# Patient Record
Sex: MALE | Race: WHITE | Employment: OTHER | URBAN - METROPOLITAN AREA
[De-identification: names, ages, dates, MRNs, and addresses within clinical notes are randomized per-mention and may not be internally consistent; named-entity substitution may affect disease eponyms.]

---

## 2017-01-11 ENCOUNTER — HOSPITAL ENCOUNTER (OUTPATIENT)
Dept: OTHER | Age: 82
Discharge: HOME | End: 2017-01-11
Attending: INTERNAL MEDICINE | Admitting: INTERNAL MEDICINE

## 2017-01-11 DIAGNOSIS — J20.8 ACUTE BRONCHITIS DUE TO OTHER SPECIFIED ORGANISMS: ICD-10-CM

## 2018-12-21 ENCOUNTER — APPOINTMENT (OUTPATIENT)
Dept: MRI IMAGING | Age: 83
DRG: 179 | End: 2018-12-21
Payer: MEDICARE

## 2018-12-21 ENCOUNTER — APPOINTMENT (OUTPATIENT)
Dept: GENERAL RADIOLOGY | Age: 83
DRG: 179 | End: 2018-12-21
Payer: MEDICARE

## 2018-12-21 ENCOUNTER — HOSPITAL ENCOUNTER (INPATIENT)
Age: 83
LOS: 1 days | Discharge: HOME HEALTH | DRG: 179 | End: 2018-12-23
Attending: EMERGENCY MEDICINE | Admitting: INTERNAL MEDICINE
Payer: MEDICARE

## 2018-12-21 ENCOUNTER — APPOINTMENT (OUTPATIENT)
Dept: CT IMAGING | Age: 83
DRG: 179 | End: 2018-12-21
Payer: MEDICARE

## 2018-12-21 DIAGNOSIS — R07.9 CHEST PAIN, UNSPECIFIED TYPE: Primary | ICD-10-CM

## 2018-12-21 DIAGNOSIS — I48.20 CHRONIC ATRIAL FIBRILLATION (HCC): ICD-10-CM

## 2018-12-21 DIAGNOSIS — R55 SYNCOPE AND COLLAPSE: ICD-10-CM

## 2018-12-21 LAB
A/G RATIO: 1.4 (ref 1.1–2.2)
ALBUMIN SERPL-MCNC: 4 G/DL (ref 3.4–5)
ALP BLD-CCNC: 89 U/L (ref 40–129)
ALT SERPL-CCNC: 11 U/L (ref 10–40)
ANION GAP SERPL CALCULATED.3IONS-SCNC: 10 MMOL/L (ref 3–16)
AST SERPL-CCNC: 18 U/L (ref 15–37)
BASOPHILS ABSOLUTE: 0.1 K/UL (ref 0–0.2)
BASOPHILS RELATIVE PERCENT: 0.7 %
BILIRUB SERPL-MCNC: 0.4 MG/DL (ref 0–1)
BUN BLDV-MCNC: 21 MG/DL (ref 7–20)
CALCIUM SERPL-MCNC: 8.7 MG/DL (ref 8.3–10.6)
CHLORIDE BLD-SCNC: 108 MMOL/L (ref 99–110)
CO2: 19 MMOL/L (ref 21–32)
CREAT SERPL-MCNC: 1.1 MG/DL (ref 0.8–1.3)
EKG ATRIAL RATE: 64 BPM
EKG DIAGNOSIS: NORMAL
EKG Q-T INTERVAL: 366 MS
EKG QRS DURATION: 84 MS
EKG QTC CALCULATION (BAZETT): 400 MS
EKG R AXIS: 9 DEGREES
EKG T AXIS: 9 DEGREES
EKG VENTRICULAR RATE: 72 BPM
EOSINOPHILS ABSOLUTE: 0.1 K/UL (ref 0–0.6)
EOSINOPHILS RELATIVE PERCENT: 1.6 %
GFR AFRICAN AMERICAN: >60
GFR NON-AFRICAN AMERICAN: >60
GLOBULIN: 2.9 G/DL
GLUCOSE BLD-MCNC: 135 MG/DL (ref 70–99)
GLUCOSE BLD-MCNC: 94 MG/DL (ref 70–99)
HCT VFR BLD CALC: 34.3 % (ref 40.5–52.5)
HEMOGLOBIN: 11.2 G/DL (ref 13.5–17.5)
INR BLD: 1.89 (ref 0.86–1.14)
LIPASE: 49 U/L (ref 13–60)
LYMPHOCYTES ABSOLUTE: 2 K/UL (ref 1–5.1)
LYMPHOCYTES RELATIVE PERCENT: 24.2 %
MAGNESIUM: 1.4 MG/DL (ref 1.8–2.4)
MCH RBC QN AUTO: 31.4 PG (ref 26–34)
MCHC RBC AUTO-ENTMCNC: 32.8 G/DL (ref 31–36)
MCV RBC AUTO: 95.7 FL (ref 80–100)
MONOCYTES ABSOLUTE: 0.4 K/UL (ref 0–1.3)
MONOCYTES RELATIVE PERCENT: 5.3 %
NEUTROPHILS ABSOLUTE: 5.7 K/UL (ref 1.7–7.7)
NEUTROPHILS RELATIVE PERCENT: 68.2 %
PDW BLD-RTO: 15.7 % (ref 12.4–15.4)
PERFORMED ON: ABNORMAL
PLATELET # BLD: 167 K/UL (ref 135–450)
PMV BLD AUTO: 10.9 FL (ref 5–10.5)
POTASSIUM SERPL-SCNC: 5.1 MMOL/L (ref 3.5–5.1)
POTASSIUM SERPL-SCNC: 5.2 MMOL/L (ref 3.5–5.1)
PRO-BNP: 3011 PG/ML (ref 0–449)
PROTHROMBIN TIME: 21.6 SEC (ref 9.8–13)
RBC # BLD: 3.58 M/UL (ref 4.2–5.9)
SODIUM BLD-SCNC: 137 MMOL/L (ref 136–145)
TOTAL PROTEIN: 6.9 G/DL (ref 6.4–8.2)
TROPONIN: <0.01 NG/ML
WBC # BLD: 8.3 K/UL (ref 4–11)

## 2018-12-21 PROCEDURE — 80053 COMPREHEN METABOLIC PANEL: CPT

## 2018-12-21 PROCEDURE — 83735 ASSAY OF MAGNESIUM: CPT

## 2018-12-21 PROCEDURE — 83036 HEMOGLOBIN GLYCOSYLATED A1C: CPT

## 2018-12-21 PROCEDURE — 84132 ASSAY OF SERUM POTASSIUM: CPT

## 2018-12-21 PROCEDURE — G0378 HOSPITAL OBSERVATION PER HR: HCPCS

## 2018-12-21 PROCEDURE — 72125 CT NECK SPINE W/O DYE: CPT

## 2018-12-21 PROCEDURE — 2580000003 HC RX 258: Performed by: PHYSICIAN ASSISTANT

## 2018-12-21 PROCEDURE — 36415 COLL VENOUS BLD VENIPUNCTURE: CPT

## 2018-12-21 PROCEDURE — 71046 X-RAY EXAM CHEST 2 VIEWS: CPT

## 2018-12-21 PROCEDURE — 84443 ASSAY THYROID STIM HORMONE: CPT

## 2018-12-21 PROCEDURE — 6360000004 HC RX CONTRAST MEDICATION: Performed by: EMERGENCY MEDICINE

## 2018-12-21 PROCEDURE — 85610 PROTHROMBIN TIME: CPT

## 2018-12-21 PROCEDURE — 84484 ASSAY OF TROPONIN QUANT: CPT

## 2018-12-21 PROCEDURE — 70450 CT HEAD/BRAIN W/O DYE: CPT

## 2018-12-21 PROCEDURE — 83690 ASSAY OF LIPASE: CPT

## 2018-12-21 PROCEDURE — 6360000002 HC RX W HCPCS: Performed by: HOSPITALIST

## 2018-12-21 PROCEDURE — 96366 THER/PROPH/DIAG IV INF ADDON: CPT

## 2018-12-21 PROCEDURE — 96365 THER/PROPH/DIAG IV INF INIT: CPT

## 2018-12-21 PROCEDURE — 99285 EMERGENCY DEPT VISIT HI MDM: CPT

## 2018-12-21 PROCEDURE — 85025 COMPLETE CBC W/AUTO DIFF WBC: CPT

## 2018-12-21 PROCEDURE — 93005 ELECTROCARDIOGRAM TRACING: CPT | Performed by: EMERGENCY MEDICINE

## 2018-12-21 PROCEDURE — 74174 CTA ABD&PLVS W/CONTRAST: CPT

## 2018-12-21 PROCEDURE — 6370000000 HC RX 637 (ALT 250 FOR IP): Performed by: HOSPITALIST

## 2018-12-21 PROCEDURE — 83880 ASSAY OF NATRIURETIC PEPTIDE: CPT

## 2018-12-21 PROCEDURE — 2580000003 HC RX 258: Performed by: HOSPITALIST

## 2018-12-21 PROCEDURE — 93010 ELECTROCARDIOGRAM REPORT: CPT | Performed by: INTERNAL MEDICINE

## 2018-12-21 PROCEDURE — 71275 CT ANGIOGRAPHY CHEST: CPT

## 2018-12-21 RX ORDER — ATORVASTATIN CALCIUM 40 MG/1
40 TABLET, FILM COATED ORAL DAILY
Status: DISCONTINUED | OUTPATIENT
Start: 2018-12-21 | End: 2018-12-23 | Stop reason: HOSPADM

## 2018-12-21 RX ORDER — NICOTINE POLACRILEX 4 MG
15 LOZENGE BUCCAL PRN
Status: DISCONTINUED | OUTPATIENT
Start: 2018-12-21 | End: 2018-12-23 | Stop reason: HOSPADM

## 2018-12-21 RX ORDER — ATENOLOL 50 MG/1
50 TABLET ORAL DAILY
Status: DISCONTINUED | OUTPATIENT
Start: 2018-12-22 | End: 2018-12-22

## 2018-12-21 RX ORDER — MAGNESIUM OXIDE 400 MG/1
400 TABLET ORAL DAILY
COMMUNITY

## 2018-12-21 RX ORDER — ONDANSETRON 2 MG/ML
4 INJECTION INTRAMUSCULAR; INTRAVENOUS EVERY 6 HOURS PRN
Status: DISCONTINUED | OUTPATIENT
Start: 2018-12-21 | End: 2018-12-23 | Stop reason: HOSPADM

## 2018-12-21 RX ORDER — WARFARIN SODIUM 3 MG/1
3 TABLET ORAL DAILY
COMMUNITY

## 2018-12-21 RX ORDER — PANTOPRAZOLE SODIUM 40 MG/1
40 TABLET, DELAYED RELEASE ORAL
Status: DISCONTINUED | OUTPATIENT
Start: 2018-12-22 | End: 2018-12-23 | Stop reason: HOSPADM

## 2018-12-21 RX ORDER — DIPHENOXYLATE HYDROCHLORIDE AND ATROPINE SULFATE 2.5; .025 MG/1; MG/1
1 TABLET ORAL 3 TIMES DAILY PRN
Status: ON HOLD | COMMUNITY
End: 2020-06-17 | Stop reason: HOSPADM

## 2018-12-21 RX ORDER — GEMFIBROZIL 600 MG/1
600 TABLET, FILM COATED ORAL 2 TIMES DAILY
Status: DISCONTINUED | OUTPATIENT
Start: 2018-12-21 | End: 2018-12-23 | Stop reason: HOSPADM

## 2018-12-21 RX ORDER — SODIUM CHLORIDE 0.9 % (FLUSH) 0.9 %
10 SYRINGE (ML) INJECTION EVERY 12 HOURS SCHEDULED
Status: DISCONTINUED | OUTPATIENT
Start: 2018-12-21 | End: 2018-12-23 | Stop reason: HOSPADM

## 2018-12-21 RX ORDER — DEXTROSE MONOHYDRATE 50 MG/ML
100 INJECTION, SOLUTION INTRAVENOUS PRN
Status: DISCONTINUED | OUTPATIENT
Start: 2018-12-21 | End: 2018-12-23 | Stop reason: HOSPADM

## 2018-12-21 RX ORDER — RAMIPRIL 5 MG/1
10 CAPSULE ORAL DAILY
Status: DISCONTINUED | OUTPATIENT
Start: 2018-12-22 | End: 2018-12-23 | Stop reason: HOSPADM

## 2018-12-21 RX ORDER — DILTIAZEM HYDROCHLORIDE 5 MG/ML
10 INJECTION INTRAVENOUS ONCE
Status: DISCONTINUED | OUTPATIENT
Start: 2018-12-21 | End: 2018-12-22

## 2018-12-21 RX ORDER — SODIUM CHLORIDE 0.9 % (FLUSH) 0.9 %
10 SYRINGE (ML) INJECTION PRN
Status: DISCONTINUED | OUTPATIENT
Start: 2018-12-21 | End: 2018-12-23 | Stop reason: HOSPADM

## 2018-12-21 RX ORDER — TAMSULOSIN HYDROCHLORIDE 0.4 MG/1
0.4 CAPSULE ORAL DAILY
Status: DISCONTINUED | OUTPATIENT
Start: 2018-12-21 | End: 2018-12-23 | Stop reason: HOSPADM

## 2018-12-21 RX ORDER — HYDRALAZINE HYDROCHLORIDE 20 MG/ML
10 INJECTION INTRAMUSCULAR; INTRAVENOUS EVERY 6 HOURS PRN
Status: DISCONTINUED | OUTPATIENT
Start: 2018-12-21 | End: 2018-12-23 | Stop reason: HOSPADM

## 2018-12-21 RX ORDER — DEXTROSE MONOHYDRATE 25 G/50ML
12.5 INJECTION, SOLUTION INTRAVENOUS PRN
Status: DISCONTINUED | OUTPATIENT
Start: 2018-12-21 | End: 2018-12-23 | Stop reason: HOSPADM

## 2018-12-21 RX ORDER — MAGNESIUM SULFATE IN WATER 40 MG/ML
2 INJECTION, SOLUTION INTRAVENOUS ONCE
Status: COMPLETED | OUTPATIENT
Start: 2018-12-21 | End: 2018-12-21

## 2018-12-21 RX ORDER — 0.9 % SODIUM CHLORIDE 0.9 %
500 INTRAVENOUS SOLUTION INTRAVENOUS ONCE
Status: COMPLETED | OUTPATIENT
Start: 2018-12-21 | End: 2018-12-22

## 2018-12-21 RX ADMIN — TAMSULOSIN HYDROCHLORIDE 0.4 MG: 0.4 CAPSULE ORAL at 22:14

## 2018-12-21 RX ADMIN — SODIUM CHLORIDE 500 ML: 9 INJECTION, SOLUTION INTRAVENOUS at 17:36

## 2018-12-21 RX ADMIN — Medication 10 ML: at 22:18

## 2018-12-21 RX ADMIN — GEMFIBROZIL 600 MG: 600 TABLET ORAL at 22:15

## 2018-12-21 RX ADMIN — MAGNESIUM SULFATE HEPTAHYDRATE 2 G: 40 INJECTION, SOLUTION INTRAVENOUS at 21:38

## 2018-12-21 RX ADMIN — IOPAMIDOL 100 ML: 755 INJECTION, SOLUTION INTRAVENOUS at 16:27

## 2018-12-21 RX ADMIN — DESMOPRESSIN ACETATE 40 MG: 0.2 TABLET ORAL at 22:14

## 2018-12-21 RX ADMIN — WARFARIN SODIUM 3 MG: 2 TABLET ORAL at 22:14

## 2018-12-21 ASSESSMENT — ENCOUNTER SYMPTOMS
VOMITING: 0
DIARRHEA: 0
SHORTNESS OF BREATH: 0
ABDOMINAL PAIN: 0
NAUSEA: 0
RHINORRHEA: 0
COUGH: 0

## 2018-12-21 ASSESSMENT — PAIN SCALES - GENERAL
PAINLEVEL_OUTOF10: 0
PAINLEVEL_OUTOF10: 0

## 2018-12-22 ENCOUNTER — APPOINTMENT (OUTPATIENT)
Dept: MRI IMAGING | Age: 83
DRG: 179 | End: 2018-12-22
Payer: MEDICARE

## 2018-12-22 PROBLEM — K21.9 GERD (GASTROESOPHAGEAL REFLUX DISEASE): Status: ACTIVE | Noted: 2018-12-22

## 2018-12-22 PROBLEM — Z79.01 ANTICOAGULATED: Status: ACTIVE | Noted: 2018-12-22

## 2018-12-22 PROBLEM — E11.9 DM2 (DIABETES MELLITUS, TYPE 2) (HCC): Status: ACTIVE | Noted: 2018-12-22

## 2018-12-22 PROBLEM — N18.2 CKD (CHRONIC KIDNEY DISEASE) STAGE 2, GFR 60-89 ML/MIN: Status: ACTIVE | Noted: 2018-12-22

## 2018-12-22 PROBLEM — K57.90 DIVERTICULOSIS: Status: ACTIVE | Noted: 2018-12-22

## 2018-12-22 PROBLEM — J69.0 ASPIRATION PNEUMONITIS (HCC): Status: ACTIVE | Noted: 2018-12-22

## 2018-12-22 PROBLEM — R07.9 CHEST PAIN: Status: ACTIVE | Noted: 2018-12-22

## 2018-12-22 PROBLEM — E66.3 OVERWEIGHT (BMI 25.0-29.9): Status: ACTIVE | Noted: 2018-12-22

## 2018-12-22 PROBLEM — E83.42 HYPOMAGNESEMIA: Status: ACTIVE | Noted: 2018-12-22

## 2018-12-22 PROBLEM — R13.10 DYSPHAGIA: Status: ACTIVE | Noted: 2018-12-22

## 2018-12-22 LAB
ANION GAP SERPL CALCULATED.3IONS-SCNC: 9 MMOL/L (ref 3–16)
BUN BLDV-MCNC: 18 MG/DL (ref 7–20)
CALCIUM SERPL-MCNC: 8.5 MG/DL (ref 8.3–10.6)
CHLORIDE BLD-SCNC: 111 MMOL/L (ref 99–110)
CO2: 21 MMOL/L (ref 21–32)
CREAT SERPL-MCNC: 1.2 MG/DL (ref 0.8–1.3)
ESTIMATED AVERAGE GLUCOSE: 114 MG/DL
GFR AFRICAN AMERICAN: >60
GFR NON-AFRICAN AMERICAN: 58
GLUCOSE BLD-MCNC: 102 MG/DL (ref 70–99)
GLUCOSE BLD-MCNC: 102 MG/DL (ref 70–99)
GLUCOSE BLD-MCNC: 107 MG/DL (ref 70–99)
GLUCOSE BLD-MCNC: 108 MG/DL (ref 70–99)
GLUCOSE BLD-MCNC: 111 MG/DL (ref 70–99)
HBA1C MFR BLD: 5.6 %
INR BLD: 1.94 (ref 0.86–1.14)
LEFT VENTRICULAR EJECTION FRACTION HIGH VALUE: 55 %
LEFT VENTRICULAR EJECTION FRACTION MODE: NORMAL
LV EF: 55 %
LV EF: 69 %
LVEF MODALITY: NORMAL
LVEF MODALITY: NORMAL
PERFORMED ON: ABNORMAL
POTASSIUM REFLEX MAGNESIUM: 4.7 MMOL/L (ref 3.5–5.1)
PROTHROMBIN TIME: 22.1 SEC (ref 9.8–13)
SODIUM BLD-SCNC: 141 MMOL/L (ref 136–145)
TSH REFLEX: 1.88 UIU/ML (ref 0.27–4.2)

## 2018-12-22 PROCEDURE — 93017 CV STRESS TEST TRACING ONLY: CPT | Performed by: INTERNAL MEDICINE

## 2018-12-22 PROCEDURE — 2500000003 HC RX 250 WO HCPCS: Performed by: INTERNAL MEDICINE

## 2018-12-22 PROCEDURE — G0378 HOSPITAL OBSERVATION PER HR: HCPCS

## 2018-12-22 PROCEDURE — 99222 1ST HOSP IP/OBS MODERATE 55: CPT | Performed by: INTERNAL MEDICINE

## 2018-12-22 PROCEDURE — 70551 MRI BRAIN STEM W/O DYE: CPT

## 2018-12-22 PROCEDURE — 82607 VITAMIN B-12: CPT

## 2018-12-22 PROCEDURE — 93306 TTE W/DOPPLER COMPLETE: CPT

## 2018-12-22 PROCEDURE — 80048 BASIC METABOLIC PNL TOTAL CA: CPT

## 2018-12-22 PROCEDURE — 6360000002 HC RX W HCPCS: Performed by: INTERNAL MEDICINE

## 2018-12-22 PROCEDURE — 92526 ORAL FUNCTION THERAPY: CPT

## 2018-12-22 PROCEDURE — A9502 TC99M TETROFOSMIN: HCPCS | Performed by: HOSPITALIST

## 2018-12-22 PROCEDURE — 6370000000 HC RX 637 (ALT 250 FOR IP): Performed by: INTERNAL MEDICINE

## 2018-12-22 PROCEDURE — 3430000000 HC RX DIAGNOSTIC RADIOPHARMACEUTICAL: Performed by: HOSPITALIST

## 2018-12-22 PROCEDURE — 92610 EVALUATE SWALLOWING FUNCTION: CPT

## 2018-12-22 PROCEDURE — 80061 LIPID PANEL: CPT

## 2018-12-22 PROCEDURE — 2580000003 HC RX 258: Performed by: HOSPITALIST

## 2018-12-22 PROCEDURE — 6370000000 HC RX 637 (ALT 250 FOR IP): Performed by: HOSPITALIST

## 2018-12-22 PROCEDURE — 85610 PROTHROMBIN TIME: CPT

## 2018-12-22 PROCEDURE — G8997 SWALLOW GOAL STATUS: HCPCS

## 2018-12-22 PROCEDURE — 1200000000 HC SEMI PRIVATE

## 2018-12-22 PROCEDURE — 2580000003 HC RX 258

## 2018-12-22 PROCEDURE — 78452 HT MUSCLE IMAGE SPECT MULT: CPT | Performed by: INTERNAL MEDICINE

## 2018-12-22 PROCEDURE — 36415 COLL VENOUS BLD VENIPUNCTURE: CPT

## 2018-12-22 PROCEDURE — G8996 SWALLOW CURRENT STATUS: HCPCS

## 2018-12-22 RX ORDER — LORAZEPAM 2 MG/ML
1 INJECTION INTRAMUSCULAR ONCE
Status: COMPLETED | OUTPATIENT
Start: 2018-12-22 | End: 2018-12-22

## 2018-12-22 RX ORDER — ACETAMINOPHEN 325 MG/1
650 TABLET ORAL EVERY 4 HOURS PRN
Status: DISCONTINUED | OUTPATIENT
Start: 2018-12-22 | End: 2018-12-23 | Stop reason: HOSPADM

## 2018-12-22 RX ORDER — SODIUM CHLORIDE 9 MG/ML
INJECTION, SOLUTION INTRAVENOUS
Status: COMPLETED
Start: 2018-12-22 | End: 2018-12-22

## 2018-12-22 RX ORDER — ATENOLOL 50 MG/1
50 TABLET ORAL DAILY
Status: DISCONTINUED | OUTPATIENT
Start: 2018-12-22 | End: 2018-12-23 | Stop reason: HOSPADM

## 2018-12-22 RX ORDER — AMINOPHYLLINE DIHYDRATE 25 MG/ML
100 INJECTION, SOLUTION INTRAVENOUS ONCE
Status: DISCONTINUED | OUTPATIENT
Start: 2018-12-22 | End: 2018-12-22

## 2018-12-22 RX ADMIN — Medication 10 ML: at 08:50

## 2018-12-22 RX ADMIN — Medication 10 ML: at 12:28

## 2018-12-22 RX ADMIN — LORAZEPAM 1 MG: 2 INJECTION INTRAMUSCULAR; INTRAVENOUS at 12:28

## 2018-12-22 RX ADMIN — RAMIPRIL 10 MG: 5 CAPSULE ORAL at 12:04

## 2018-12-22 RX ADMIN — GEMFIBROZIL 600 MG: 600 TABLET ORAL at 12:04

## 2018-12-22 RX ADMIN — TAZOBACTAM SODIUM AND PIPERACILLIN SODIUM 3.38 G: 375; 3 INJECTION, SOLUTION INTRAVENOUS at 14:03

## 2018-12-22 RX ADMIN — REGADENOSON 0.4 MG: 0.08 INJECTION, SOLUTION INTRAVENOUS at 10:11

## 2018-12-22 RX ADMIN — DESMOPRESSIN ACETATE 40 MG: 0.2 TABLET ORAL at 20:50

## 2018-12-22 RX ADMIN — WARFARIN SODIUM 3 MG: 2 TABLET ORAL at 17:52

## 2018-12-22 RX ADMIN — SODIUM CHLORIDE 50 ML: 9 INJECTION, SOLUTION INTRAVENOUS at 14:04

## 2018-12-22 RX ADMIN — TAMSULOSIN HYDROCHLORIDE 0.4 MG: 0.4 CAPSULE ORAL at 20:50

## 2018-12-22 RX ADMIN — ATENOLOL 50 MG: 50 TABLET ORAL at 12:04

## 2018-12-22 RX ADMIN — TETROFOSMIN 10 MILLICURIE: 0.23 INJECTION, POWDER, LYOPHILIZED, FOR SOLUTION INTRAVENOUS at 09:00

## 2018-12-22 RX ADMIN — TETROFOSMIN 30 MILLICURIE: 0.23 INJECTION, POWDER, LYOPHILIZED, FOR SOLUTION INTRAVENOUS at 10:18

## 2018-12-22 RX ADMIN — Medication 10 ML: at 20:51

## 2018-12-22 RX ADMIN — TAZOBACTAM SODIUM AND PIPERACILLIN SODIUM 3.38 G: 375; 3 INJECTION, SOLUTION INTRAVENOUS at 22:08

## 2018-12-22 RX ADMIN — GEMFIBROZIL 600 MG: 600 TABLET ORAL at 20:51

## 2018-12-22 RX ADMIN — PANTOPRAZOLE SODIUM 40 MG: 40 TABLET, DELAYED RELEASE ORAL at 08:50

## 2018-12-22 ASSESSMENT — PAIN SCALES - GENERAL
PAINLEVEL_OUTOF10: 0

## 2018-12-23 VITALS
TEMPERATURE: 98.2 F | RESPIRATION RATE: 18 BRPM | SYSTOLIC BLOOD PRESSURE: 147 MMHG | BODY MASS INDEX: 26.84 KG/M2 | DIASTOLIC BLOOD PRESSURE: 78 MMHG | HEIGHT: 66 IN | HEART RATE: 84 BPM | OXYGEN SATURATION: 99 % | WEIGHT: 167 LBS

## 2018-12-23 LAB
ANION GAP SERPL CALCULATED.3IONS-SCNC: 10 MMOL/L (ref 3–16)
BASOPHILS ABSOLUTE: 0 K/UL (ref 0–0.2)
BASOPHILS RELATIVE PERCENT: 0.7 %
BUN BLDV-MCNC: 15 MG/DL (ref 7–20)
CALCIUM SERPL-MCNC: 8.5 MG/DL (ref 8.3–10.6)
CHLORIDE BLD-SCNC: 111 MMOL/L (ref 99–110)
CHOLESTEROL, TOTAL: 93 MG/DL (ref 0–199)
CO2: 19 MMOL/L (ref 21–32)
CREAT SERPL-MCNC: 1.1 MG/DL (ref 0.8–1.3)
EOSINOPHILS ABSOLUTE: 0.2 K/UL (ref 0–0.6)
EOSINOPHILS RELATIVE PERCENT: 2.1 %
GFR AFRICAN AMERICAN: >60
GFR NON-AFRICAN AMERICAN: >60
GLUCOSE BLD-MCNC: 103 MG/DL (ref 70–99)
GLUCOSE BLD-MCNC: 105 MG/DL (ref 70–99)
GLUCOSE BLD-MCNC: 161 MG/DL (ref 70–99)
HCT VFR BLD CALC: 33.2 % (ref 40.5–52.5)
HDLC SERPL-MCNC: 34 MG/DL (ref 40–60)
HEMOGLOBIN: 11 G/DL (ref 13.5–17.5)
INR BLD: 2.88 (ref 0.86–1.14)
LDL CHOLESTEROL CALCULATED: 51 MG/DL
LYMPHOCYTES ABSOLUTE: 2.1 K/UL (ref 1–5.1)
LYMPHOCYTES RELATIVE PERCENT: 28.2 %
MAGNESIUM: 1.7 MG/DL (ref 1.8–2.4)
MCH RBC QN AUTO: 31.4 PG (ref 26–34)
MCHC RBC AUTO-ENTMCNC: 33 G/DL (ref 31–36)
MCV RBC AUTO: 95.2 FL (ref 80–100)
MONOCYTES ABSOLUTE: 0.5 K/UL (ref 0–1.3)
MONOCYTES RELATIVE PERCENT: 7.4 %
NEUTROPHILS ABSOLUTE: 4.6 K/UL (ref 1.7–7.7)
NEUTROPHILS RELATIVE PERCENT: 61.6 %
PDW BLD-RTO: 15.3 % (ref 12.4–15.4)
PERFORMED ON: ABNORMAL
PERFORMED ON: ABNORMAL
PLATELET # BLD: 147 K/UL (ref 135–450)
PMV BLD AUTO: 10.7 FL (ref 5–10.5)
POTASSIUM SERPL-SCNC: 4.2 MMOL/L (ref 3.5–5.1)
PROTHROMBIN TIME: 32.8 SEC (ref 9.8–13)
RBC # BLD: 3.48 M/UL (ref 4.2–5.9)
SODIUM BLD-SCNC: 140 MMOL/L (ref 136–145)
TRIGL SERPL-MCNC: 42 MG/DL (ref 0–150)
VITAMIN B-12: 1288 PG/ML (ref 211–911)
VLDLC SERPL CALC-MCNC: 8 MG/DL
WBC # BLD: 7.4 K/UL (ref 4–11)

## 2018-12-23 PROCEDURE — G8987 SELF CARE CURRENT STATUS: HCPCS

## 2018-12-23 PROCEDURE — 6370000000 HC RX 637 (ALT 250 FOR IP): Performed by: HOSPITALIST

## 2018-12-23 PROCEDURE — 97530 THERAPEUTIC ACTIVITIES: CPT

## 2018-12-23 PROCEDURE — 97165 OT EVAL LOW COMPLEX 30 MIN: CPT

## 2018-12-23 PROCEDURE — 2580000003 HC RX 258: Performed by: HOSPITALIST

## 2018-12-23 PROCEDURE — 96366 THER/PROPH/DIAG IV INF ADDON: CPT

## 2018-12-23 PROCEDURE — 99231 SBSQ HOSP IP/OBS SF/LOW 25: CPT | Performed by: NURSE PRACTITIONER

## 2018-12-23 PROCEDURE — G8989 SELF CARE D/C STATUS: HCPCS

## 2018-12-23 PROCEDURE — G8979 MOBILITY GOAL STATUS: HCPCS

## 2018-12-23 PROCEDURE — 6370000000 HC RX 637 (ALT 250 FOR IP): Performed by: INTERNAL MEDICINE

## 2018-12-23 PROCEDURE — 85025 COMPLETE CBC W/AUTO DIFF WBC: CPT

## 2018-12-23 PROCEDURE — G8980 MOBILITY D/C STATUS: HCPCS

## 2018-12-23 PROCEDURE — G8988 SELF CARE GOAL STATUS: HCPCS

## 2018-12-23 PROCEDURE — 36415 COLL VENOUS BLD VENIPUNCTURE: CPT

## 2018-12-23 PROCEDURE — 2500000003 HC RX 250 WO HCPCS: Performed by: INTERNAL MEDICINE

## 2018-12-23 PROCEDURE — 85610 PROTHROMBIN TIME: CPT

## 2018-12-23 PROCEDURE — 83735 ASSAY OF MAGNESIUM: CPT

## 2018-12-23 PROCEDURE — G8978 MOBILITY CURRENT STATUS: HCPCS

## 2018-12-23 PROCEDURE — G0378 HOSPITAL OBSERVATION PER HR: HCPCS

## 2018-12-23 PROCEDURE — 97161 PT EVAL LOW COMPLEX 20 MIN: CPT

## 2018-12-23 PROCEDURE — 80048 BASIC METABOLIC PNL TOTAL CA: CPT

## 2018-12-23 RX ORDER — WARFARIN SODIUM 2 MG/1
2 TABLET ORAL DAILY
Status: DISCONTINUED | OUTPATIENT
Start: 2018-12-24 | End: 2018-12-23 | Stop reason: HOSPADM

## 2018-12-23 RX ORDER — WARFARIN SODIUM 2 MG/1
2 TABLET ORAL DAILY
Status: DISCONTINUED | OUTPATIENT
Start: 2018-12-23 | End: 2018-12-23

## 2018-12-23 RX ORDER — AMOXICILLIN AND CLAVULANATE POTASSIUM 875; 125 MG/1; MG/1
1 TABLET, FILM COATED ORAL 2 TIMES DAILY
Qty: 12 TABLET | Refills: 0 | Status: SHIPPED | OUTPATIENT
Start: 2018-12-23 | End: 2018-12-29

## 2018-12-23 RX ADMIN — TAZOBACTAM SODIUM AND PIPERACILLIN SODIUM 3.38 G: 375; 3 INJECTION, SOLUTION INTRAVENOUS at 06:11

## 2018-12-23 RX ADMIN — RAMIPRIL 10 MG: 5 CAPSULE ORAL at 09:55

## 2018-12-23 RX ADMIN — GEMFIBROZIL 600 MG: 600 TABLET ORAL at 09:56

## 2018-12-23 RX ADMIN — Medication 10 ML: at 09:56

## 2018-12-23 RX ADMIN — ATENOLOL 50 MG: 50 TABLET ORAL at 09:55

## 2018-12-23 RX ADMIN — PANTOPRAZOLE SODIUM 40 MG: 40 TABLET, DELAYED RELEASE ORAL at 06:53

## 2018-12-23 ASSESSMENT — PAIN SCALES - GENERAL: PAINLEVEL_OUTOF10: 0

## 2018-12-31 ENCOUNTER — HOSPITAL ENCOUNTER (OUTPATIENT)
Dept: GENERAL RADIOLOGY | Age: 83
Discharge: HOME | End: 2018-12-31
Payer: MEDICARE

## 2018-12-31 DIAGNOSIS — R13.11 ORAL PHASE DYSPHAGIA: ICD-10-CM

## 2018-12-31 DIAGNOSIS — R13.10 DYSPHAGIA, UNSPECIFIED TYPE: ICD-10-CM

## 2018-12-31 PROCEDURE — 74230 X-RAY XM SWLNG FUNCJ C+: CPT

## 2018-12-31 PROCEDURE — 92526 ORAL FUNCTION THERAPY: CPT

## 2018-12-31 PROCEDURE — G8996 SWALLOW CURRENT STATUS: HCPCS

## 2018-12-31 PROCEDURE — 74220 X-RAY XM ESOPHAGUS 1CNTRST: CPT

## 2018-12-31 PROCEDURE — G8997 SWALLOW GOAL STATUS: HCPCS

## 2018-12-31 PROCEDURE — 92611 MOTION FLUOROSCOPY/SWALLOW: CPT

## 2019-01-18 ENCOUNTER — TELEPHONE (OUTPATIENT)
Dept: INPATIENT UNIT | Age: 84
End: 2019-01-18

## 2020-06-03 ENCOUNTER — APPOINTMENT (OUTPATIENT)
Dept: CT IMAGING | Age: 85
End: 2020-06-03
Payer: MEDICARE

## 2020-06-03 ENCOUNTER — HOSPITAL ENCOUNTER (EMERGENCY)
Age: 85
Discharge: PSYCHIATRIC OR SUBSTANCE USE FACILITY | End: 2020-06-04
Attending: EMERGENCY MEDICINE
Payer: MEDICARE

## 2020-06-03 ENCOUNTER — APPOINTMENT (OUTPATIENT)
Dept: GENERAL RADIOLOGY | Age: 85
End: 2020-06-03
Payer: MEDICARE

## 2020-06-03 LAB
A/G RATIO: 1.4 (ref 1.1–2.2)
ACETAMINOPHEN LEVEL: <5 UG/ML (ref 10–30)
ALBUMIN SERPL-MCNC: 3.9 G/DL (ref 3.4–5)
ALP BLD-CCNC: 97 U/L (ref 40–129)
ALT SERPL-CCNC: 14 U/L (ref 10–40)
AMPHETAMINE SCREEN, URINE: NORMAL
ANION GAP SERPL CALCULATED.3IONS-SCNC: 11 MMOL/L (ref 3–16)
APTT: 40.8 SEC (ref 24.2–36.2)
AST SERPL-CCNC: 18 U/L (ref 15–37)
BARBITURATE SCREEN URINE: NORMAL
BASOPHILS ABSOLUTE: 0.1 K/UL (ref 0–0.2)
BASOPHILS RELATIVE PERCENT: 0.7 %
BENZODIAZEPINE SCREEN, URINE: NORMAL
BILIRUB SERPL-MCNC: 0.5 MG/DL (ref 0–1)
BILIRUBIN URINE: NEGATIVE
BLOOD, URINE: NEGATIVE
BUN BLDV-MCNC: 19 MG/DL (ref 7–20)
CALCIUM SERPL-MCNC: 8.7 MG/DL (ref 8.3–10.6)
CANNABINOID SCREEN URINE: NORMAL
CHLORIDE BLD-SCNC: 106 MMOL/L (ref 99–110)
CLARITY: CLEAR
CO2: 23 MMOL/L (ref 21–32)
COCAINE METABOLITE SCREEN URINE: NORMAL
COLOR: YELLOW
CREAT SERPL-MCNC: 1.2 MG/DL (ref 0.8–1.3)
EKG ATRIAL RATE: 105 BPM
EKG DIAGNOSIS: NORMAL
EKG Q-T INTERVAL: 374 MS
EKG QRS DURATION: 86 MS
EKG QTC CALCULATION (BAZETT): 431 MS
EKG R AXIS: 16 DEGREES
EKG T AXIS: 35 DEGREES
EKG VENTRICULAR RATE: 80 BPM
EOSINOPHILS ABSOLUTE: 0.1 K/UL (ref 0–0.6)
EOSINOPHILS RELATIVE PERCENT: 0.7 %
EPITHELIAL CELLS, UA: NORMAL /HPF (ref 0–5)
ETHANOL: NORMAL MG/DL (ref 0–0.08)
GFR AFRICAN AMERICAN: >60
GFR NON-AFRICAN AMERICAN: 58
GLOBULIN: 2.8 G/DL
GLUCOSE BLD-MCNC: 160 MG/DL (ref 70–99)
GLUCOSE URINE: NEGATIVE MG/DL
HCT VFR BLD CALC: 35.5 % (ref 40.5–52.5)
HEMOGLOBIN: 11.8 G/DL (ref 13.5–17.5)
INR BLD: 2.73 (ref 0.86–1.14)
KETONES, URINE: NEGATIVE MG/DL
LEUKOCYTE ESTERASE, URINE: NEGATIVE
LYMPHOCYTES ABSOLUTE: 2.3 K/UL (ref 1–5.1)
LYMPHOCYTES RELATIVE PERCENT: 25.2 %
Lab: NORMAL
MCH RBC QN AUTO: 30.7 PG (ref 26–34)
MCHC RBC AUTO-ENTMCNC: 33.4 G/DL (ref 31–36)
MCV RBC AUTO: 92.1 FL (ref 80–100)
METHADONE SCREEN, URINE: NORMAL
MICROSCOPIC EXAMINATION: YES
MONOCYTES ABSOLUTE: 0.6 K/UL (ref 0–1.3)
MONOCYTES RELATIVE PERCENT: 6.3 %
NEUTROPHILS ABSOLUTE: 6.1 K/UL (ref 1.7–7.7)
NEUTROPHILS RELATIVE PERCENT: 67.1 %
NITRITE, URINE: NEGATIVE
OPIATE SCREEN URINE: NORMAL
OXYCODONE URINE: NORMAL
PDW BLD-RTO: 15.3 % (ref 12.4–15.4)
PH UA: 6
PH UA: 6 (ref 5–8)
PHENCYCLIDINE SCREEN URINE: NORMAL
PLATELET # BLD: 178 K/UL (ref 135–450)
PMV BLD AUTO: 10.3 FL (ref 5–10.5)
POTASSIUM SERPL-SCNC: 3.9 MMOL/L (ref 3.5–5.1)
PRO-BNP: 2335 PG/ML (ref 0–449)
PROPOXYPHENE SCREEN: NORMAL
PROTEIN UA: 100 MG/DL
PROTHROMBIN TIME: 32 SEC (ref 10–13.2)
RBC # BLD: 3.85 M/UL (ref 4.2–5.9)
RBC UA: NORMAL /HPF (ref 0–4)
SALICYLATE, SERUM: 2.9 MG/DL (ref 15–30)
SODIUM BLD-SCNC: 140 MMOL/L (ref 136–145)
SPECIFIC GRAVITY UA: 1.02 (ref 1–1.03)
TOTAL PROTEIN: 6.7 G/DL (ref 6.4–8.2)
TROPONIN: <0.01 NG/ML
URINE REFLEX TO CULTURE: ABNORMAL
URINE TYPE: ABNORMAL
UROBILINOGEN, URINE: 0.2 E.U./DL
WBC # BLD: 9 K/UL (ref 4–11)
WBC UA: NORMAL /HPF (ref 0–5)

## 2020-06-03 PROCEDURE — 85025 COMPLETE CBC W/AUTO DIFF WBC: CPT

## 2020-06-03 PROCEDURE — 85730 THROMBOPLASTIN TIME PARTIAL: CPT

## 2020-06-03 PROCEDURE — G0480 DRUG TEST DEF 1-7 CLASSES: HCPCS

## 2020-06-03 PROCEDURE — 81001 URINALYSIS AUTO W/SCOPE: CPT

## 2020-06-03 PROCEDURE — 70450 CT HEAD/BRAIN W/O DYE: CPT

## 2020-06-03 PROCEDURE — 83880 ASSAY OF NATRIURETIC PEPTIDE: CPT

## 2020-06-03 PROCEDURE — 96365 THER/PROPH/DIAG IV INF INIT: CPT

## 2020-06-03 PROCEDURE — 6360000002 HC RX W HCPCS

## 2020-06-03 PROCEDURE — 96366 THER/PROPH/DIAG IV INF ADDON: CPT

## 2020-06-03 PROCEDURE — 71045 X-RAY EXAM CHEST 1 VIEW: CPT

## 2020-06-03 PROCEDURE — 6360000002 HC RX W HCPCS: Performed by: PHYSICIAN ASSISTANT

## 2020-06-03 PROCEDURE — 84484 ASSAY OF TROPONIN QUANT: CPT

## 2020-06-03 PROCEDURE — 80053 COMPREHEN METABOLIC PANEL: CPT

## 2020-06-03 PROCEDURE — 93005 ELECTROCARDIOGRAM TRACING: CPT | Performed by: PHYSICIAN ASSISTANT

## 2020-06-03 PROCEDURE — 85610 PROTHROMBIN TIME: CPT

## 2020-06-03 PROCEDURE — 96374 THER/PROPH/DIAG INJ IV PUSH: CPT

## 2020-06-03 PROCEDURE — 99285 EMERGENCY DEPT VISIT HI MDM: CPT

## 2020-06-03 PROCEDURE — 93010 ELECTROCARDIOGRAM REPORT: CPT | Performed by: INTERNAL MEDICINE

## 2020-06-03 PROCEDURE — 6370000000 HC RX 637 (ALT 250 FOR IP): Performed by: EMERGENCY MEDICINE

## 2020-06-03 PROCEDURE — 80307 DRUG TEST PRSMV CHEM ANLYZR: CPT

## 2020-06-03 PROCEDURE — 71250 CT THORAX DX C-: CPT

## 2020-06-03 PROCEDURE — 96376 TX/PRO/DX INJ SAME DRUG ADON: CPT

## 2020-06-03 RX ORDER — LORAZEPAM 2 MG/ML
INJECTION INTRAMUSCULAR
Status: COMPLETED
Start: 2020-06-03 | End: 2020-06-03

## 2020-06-03 RX ORDER — GEMFIBROZIL 600 MG/1
600 TABLET, FILM COATED ORAL ONCE
Status: COMPLETED | OUTPATIENT
Start: 2020-06-03 | End: 2020-06-03

## 2020-06-03 RX ORDER — ATENOLOL 50 MG/1
50 TABLET ORAL DAILY
Status: DISCONTINUED | OUTPATIENT
Start: 2020-06-03 | End: 2020-06-04 | Stop reason: HOSPADM

## 2020-06-03 RX ORDER — OMEPRAZOLE 20 MG/1
40 CAPSULE, DELAYED RELEASE ORAL DAILY
COMMUNITY
End: 2020-06-09 | Stop reason: ALTCHOICE

## 2020-06-03 RX ORDER — DONEPEZIL HYDROCHLORIDE 5 MG/1
5 TABLET, FILM COATED ORAL ONCE
Status: COMPLETED | OUTPATIENT
Start: 2020-06-03 | End: 2020-06-03

## 2020-06-03 RX ORDER — DONEPEZIL HYDROCHLORIDE 5 MG/1
10 TABLET, FILM COATED ORAL NIGHTLY
COMMUNITY

## 2020-06-03 RX ORDER — ATORVASTATIN CALCIUM 80 MG/1
40 TABLET, FILM COATED ORAL NIGHTLY
Status: DISCONTINUED | OUTPATIENT
Start: 2020-06-03 | End: 2020-06-04 | Stop reason: HOSPADM

## 2020-06-03 RX ORDER — LORAZEPAM 2 MG/ML
0.5 INJECTION INTRAMUSCULAR ONCE
Status: COMPLETED | OUTPATIENT
Start: 2020-06-03 | End: 2020-06-03

## 2020-06-03 RX ORDER — ATORVASTATIN CALCIUM 40 MG/1
40 TABLET, FILM COATED ORAL DAILY
COMMUNITY
End: 2020-06-09 | Stop reason: ALTCHOICE

## 2020-06-03 RX ADMIN — WARFARIN SODIUM 3 MG: 2 TABLET ORAL at 22:43

## 2020-06-03 RX ADMIN — LORAZEPAM 0.5 MG: 2 INJECTION INTRAMUSCULAR; INTRAVENOUS at 19:43

## 2020-06-03 RX ADMIN — ATENOLOL 50 MG: 50 TABLET ORAL at 22:43

## 2020-06-03 RX ADMIN — DONEPEZIL HYDROCHLORIDE 5 MG: 5 TABLET, FILM COATED ORAL at 22:43

## 2020-06-03 RX ADMIN — GEMFIBROZIL 600 MG: 600 TABLET ORAL at 22:43

## 2020-06-03 RX ADMIN — LORAZEPAM 0.5 MG: 2 INJECTION INTRAMUSCULAR; INTRAVENOUS at 15:49

## 2020-06-03 RX ADMIN — ATORVASTATIN CALCIUM 40 MG: 80 TABLET, FILM COATED ORAL at 22:43

## 2020-06-03 ASSESSMENT — ENCOUNTER SYMPTOMS
RHINORRHEA: 0
NAUSEA: 0
ABDOMINAL PAIN: 0
DIARRHEA: 0
SHORTNESS OF BREATH: 0
COUGH: 0
VOMITING: 0

## 2020-06-03 NOTE — ED PROVIDER NOTES
50 MG TABLET    Take 1 tablet by mouth daily    ATORVASTATIN (LIPITOR) 40 MG TABLET    Take 1 tablet by mouth daily    ATORVASTATIN (LIPITOR) 40 MG TABLET    Take 40 mg by mouth daily    CALCIUM CARBONATE-VITAMIN D (CALCIUM 600 + D) 600-400 MG-UNIT TABS PER TAB    Take 1 tablet by mouth 2 times daily    DIPHENOXYLATE-ATROPINE (LOMOTIL) 2.5-0.025 MG PER TABLET    Take 1 tablet by mouth 3 times daily as needed for Diarrhea. .    DONEPEZIL (ARICEPT) 5 MG TABLET    Take 5 mg by mouth nightly    FOLBIC 2.5-25-2 MG TABS    TAKE 1 TABLET BY MOUTH DAILY. FREESTYLE LANCETS MISC    Testing bid dx:250.00    GEMFIBROZIL (LOPID) 600 MG TABLET    Take 1 tablet by mouth 2 times daily    GLUCOSE BLOOD VI TEST STRIPS (ACCU-CHEK COMFORT CURVE) STRIP    TEST TWICE A DAY    MAGNESIUM OXIDE (MAG-OX) 400 MG TABLET    Take 400 mg by mouth daily    OMEPRAZOLE (PRILOSEC) 20 MG CAPSULE    Take 40 mg by mouth daily    OMEPRAZOLE (PRILOSEC) 20 MG DELAYED RELEASE CAPSULE    Take 40 mg by mouth daily    RAMIPRIL (ALTACE) 10 MG CAPSULE    Take 1 capsule by mouth daily    SITAGLIPTIN (JANUVIA) 100 MG TABLET    Take 100 mg by mouth daily    TAMSULOSIN (FLOMAX) 0.4 MG CAPSULE    Take 1 capsule by mouth daily    VITAMIN D (ERGOCALCIFEROL) 52207 UNITS CAPS CAPSULE    TAKE 1 CAPSULE BY MOUTH ONCE A WEEK. WARFARIN (COUMADIN) 3 MG TABLET    Take 3 mg by mouth daily         ALLERGIES     Patient has no known allergies.     FAMILYHISTORY       Family History   Problem Relation Age of Onset    Arthritis Other     Cancer Other     Heart Disease Other     Stroke Other           SOCIAL HISTORY       Social History     Tobacco Use    Smoking status: Former Smoker    Smokeless tobacco: Never Used   Substance Use Topics    Alcohol use: No    Drug use: No       SCREENINGS             PHYSICAL EXAM    (up to 7 for level 4, 8 or more for level 5)     ED Triage Vitals [06/03/20 1002]   BP Temp Temp Source Pulse Resp SpO2 Height Weight   (!) 153/88 97.7 °F (36.5 °C) Oral 83 16 97 % 5' 7\" (1.702 m) 175 lb (79.4 kg)       Physical Exam  Vitals signs and nursing note reviewed. Constitutional:       Appearance: He is well-developed. He is not diaphoretic. HENT:      Head: Normocephalic and atraumatic. Right Ear: External ear normal.      Left Ear: External ear normal.      Nose: Nose normal.   Eyes:      General:         Right eye: No discharge. Left eye: No discharge. Neck:      Musculoskeletal: Normal range of motion and neck supple. Trachea: No tracheal deviation. Cardiovascular:      Rate and Rhythm: Normal rate. Rhythm irregular. Heart sounds: No murmur. Pulmonary:      Effort: Pulmonary effort is normal. No respiratory distress. Breath sounds: Normal breath sounds. No wheezing or rales. Abdominal:      General: Bowel sounds are normal. There is no distension. Palpations: Abdomen is soft. Tenderness: There is no abdominal tenderness. There is no guarding. Musculoskeletal: Normal range of motion. Skin:     General: Skin is warm and dry. Neurological:      General: No focal deficit present. Mental Status: He is alert. Mental status is at baseline. GCS: GCS eye subscore is 4. GCS verbal subscore is 5. GCS motor subscore is 6.    Psychiatric:         Behavior: Behavior normal.         DIAGNOSTIC RESULTS   LABS:    Labs Reviewed   CBC WITH AUTO DIFFERENTIAL - Abnormal; Notable for the following components:       Result Value    RBC 3.85 (*)     Hemoglobin 11.8 (*)     Hematocrit 35.5 (*)     All other components within normal limits    Narrative:     Performed at:  [facility] [address], [place], [facility][note 3] [address]   Phone [phone]   COMPREHENSIVE METABOLIC PANEL - Abnormal; Notable for the following components:    Glucose 160 (*)     GFR Non- 58 (*)     All other components within normal limits    Narrative:     Performed at:  [facility] over the last few days pt was was becoming more aggressIve/sleeping less/refusing to eat and stating that he wantd to harm himself with a plan to shoot himself. Per son pt does not have access to a gun and has no hx of self harm or SI. Pt also is scheduled to have am MRI done this afternoon) Acuity: Acute Type of Exam: Initial FINDINGS: There is new subtle right lower lobe airspace disease concerning for pneumonia. No change in the bibasilar scarring. Cardiomegaly is stable. The aorta is tortuous. There is no pneumothorax or pleural effusion. 1. Subtle right lower lobe airspace disease concerning for pneumonia. Recommend chest radiograph in 8 weeks to confirm resolution. PROCEDURES   Unless otherwise noted below, none     Procedures    CRITICAL CARE TIME   N/A    CONSULTS:  None      EMERGENCY DEPARTMENT COURSE and DIFFERENTIAL DIAGNOSIS/MDM:   Vitals:    Vitals:    06/03/20 1430 06/03/20 1500 06/03/20 1514 06/03/20 1530   BP:       Pulse: 83 77  87   Resp: 18 19  19   Temp:   98.3 °F (36.8 °C)    TempSrc:   Oral    SpO2: 100% 99%  95%   Weight:       Height:           Patient was given the following medications:  Medications   LORazepam (ATIVAN) injection 0.5 mg (0.5 mg Intravenous Given 6/3/20 1549)       The patient presents to the emergency department today with his son for evaluation for agitation. The patient has a history of chronic kidney disease, A. fib, is anticoagulated on Coumadin as well as hypertension hyperlipidemia. The patient was recently diagnosed with dementia, and the son states that over the past 2 weeks the patient has been gradually declining. He states that this week in particular the patient seems to have more agitation. He states that this morning the patient was very agitated, and he told his wife that he wanted to commit suicide.   The patient is currently denying any suicidal ideation or homicidal ideation at this time, the son states that the patient was just worked

## 2020-06-03 NOTE — ED NOTES
Pt food tray left at the bedside at this time as patient is sleeping.  Family told to call this RN and will reheat when patient awakes     Michel Cruz, RN  06/03/20 0692

## 2020-06-03 NOTE — ED NOTES
Called the transfer center per Alex, [phone]. Pt is medically cleared and ready for Geriatric Psych transfer. Talked to [facility] and started the case. [facility] will ailin back with updates.      [employer]  06/03/20 1678

## 2020-06-03 NOTE — ED NOTES
She from the transfer center called back @ [phone]. Pt has been accepted by [facility] in [facility] [place] [facility]". Accepting physician is Dr Reno Can at [facility]. Report number is [id number] (option1). [place] faxed to [phone]. Will call back with Transport and bed assignment was ER Rn calls report.      [employer]  06/03/20 Elizabeth Kerr  06/03/20 0632

## 2020-06-03 NOTE — ED NOTES
Bed: 05  Expected date:   Expected time:   Means of arrival:   Comments:  [address], RN  06/03/20 0733

## 2020-06-03 NOTE — ED NOTES
Called the transfer center and spoke with Antelmo Amanda for an update. Antelmo Amanda stated [address] in [place] and [facility] are reviewing the patient's packet. Will call back with an update.      [employer]  06/03/20 4945

## 2020-06-03 NOTE — ED NOTES
Per family pt was diagnosed with dementia recently and the last two weeks has been getting more aggressive and agitated with family at home. Family states that patient was very agitated earlier today and stated he was going to kill himself with a gun. Pt denies any SI thoughts at this time or attempts. Family states patient is also sleeping less and starting to refuse to eat. Pt cooperative at time of this nurse taking over care. EKG and PIV inserted at this time without complications. Pt and family deny and illness or complaints, denies CP, SOB, fever, urinary issues. PCP sent patient in to be evaluated. Pt placed on telemetry monitoring with ST segment capabilities, pt states no further needs at this time, will continue to monitor.       Siri Quiroga RN  06/03/20 2666

## 2020-06-04 VITALS
HEIGHT: 67 IN | RESPIRATION RATE: 25 BRPM | HEART RATE: 72 BPM | BODY MASS INDEX: 27.47 KG/M2 | OXYGEN SATURATION: 93 % | WEIGHT: 175 LBS | DIASTOLIC BLOOD PRESSURE: 56 MMHG | SYSTOLIC BLOOD PRESSURE: 153 MMHG | TEMPERATURE: 98.3 F

## 2020-06-04 PROCEDURE — 96375 TX/PRO/DX INJ NEW DRUG ADDON: CPT

## 2020-06-04 PROCEDURE — 6360000002 HC RX W HCPCS: Performed by: EMERGENCY MEDICINE

## 2020-06-04 RX ORDER — HALOPERIDOL 5 MG/ML
2 INJECTION INTRAMUSCULAR ONCE
Status: COMPLETED | OUTPATIENT
Start: 2020-06-04 | End: 2020-06-04

## 2020-06-04 RX ADMIN — HALOPERIDOL LACTATE 2 MG: 5 INJECTION, SOLUTION INTRAMUSCULAR at 00:14

## 2020-06-04 NOTE — ED NOTES
Report given to [place] [facility] - pt getting agitated - haldol given for transport. Respirations even and unlabored. Assisted pt to urinate in urinal. Pt transferred to [facility] in [facility].      Noy Holguin RN  06/04/20 5666

## 2020-06-09 ENCOUNTER — APPOINTMENT (OUTPATIENT)
Dept: CT IMAGING | Age: 85
DRG: 682 | End: 2020-06-09
Payer: MEDICARE

## 2020-06-09 ENCOUNTER — APPOINTMENT (OUTPATIENT)
Dept: GENERAL RADIOLOGY | Age: 85
DRG: 682 | End: 2020-06-09
Payer: MEDICARE

## 2020-06-09 ENCOUNTER — HOSPITAL ENCOUNTER (INPATIENT)
Age: 85
LOS: 8 days | Discharge: HOME | DRG: 682 | End: 2020-06-17
Attending: EMERGENCY MEDICINE | Admitting: HOSPITALIST
Payer: MEDICARE

## 2020-06-09 PROBLEM — R41.82 ALTERED MENTAL STATUS: Status: ACTIVE | Noted: 2020-06-09

## 2020-06-09 PROBLEM — N17.9 ACUTE KIDNEY INJURY SUPERIMPOSED ON CHRONIC KIDNEY DISEASE (HCC): Status: ACTIVE | Noted: 2020-06-09

## 2020-06-09 PROBLEM — R41.0 DISORIENTATION: Status: ACTIVE | Noted: 2020-06-09

## 2020-06-09 PROBLEM — R33.8 ACUTE RETENTION OF URINE: Status: ACTIVE | Noted: 2020-06-09

## 2020-06-09 PROBLEM — N18.9 ACUTE KIDNEY INJURY SUPERIMPOSED ON CHRONIC KIDNEY DISEASE (HCC): Status: ACTIVE | Noted: 2020-06-09

## 2020-06-09 LAB
A/G RATIO: 1.1 (ref 1.1–2.2)
ALBUMIN SERPL-MCNC: 3.5 G/DL (ref 3.4–5)
ALP BLD-CCNC: 93 U/L (ref 40–129)
ALT SERPL-CCNC: 23 U/L (ref 10–40)
ANION GAP SERPL CALCULATED.3IONS-SCNC: 18 MMOL/L (ref 3–16)
APTT: 37.7 SEC (ref 24.2–36.2)
AST SERPL-CCNC: 43 U/L (ref 15–37)
BASOPHILS ABSOLUTE: 0.1 K/UL (ref 0–0.2)
BASOPHILS RELATIVE PERCENT: 0.9 %
BILIRUB SERPL-MCNC: 0.3 MG/DL (ref 0–1)
BILIRUBIN URINE: NEGATIVE
BLOOD, URINE: ABNORMAL
BUN BLDV-MCNC: 96 MG/DL (ref 7–20)
CALCIUM SERPL-MCNC: 8.4 MG/DL (ref 8.3–10.6)
CHLORIDE BLD-SCNC: 103 MMOL/L (ref 99–110)
CLARITY: CLEAR
CO2: 17 MMOL/L (ref 21–32)
COLOR: ABNORMAL
CREAT SERPL-MCNC: 5.4 MG/DL (ref 0.8–1.3)
EOSINOPHILS ABSOLUTE: 0.2 K/UL (ref 0–0.6)
EOSINOPHILS RELATIVE PERCENT: 1.4 %
EPITHELIAL CELLS, UA: 0 /HPF (ref 0–5)
GFR AFRICAN AMERICAN: 12
GFR NON-AFRICAN AMERICAN: 10
GLOBULIN: 3.3 G/DL
GLUCOSE BLD-MCNC: 129 MG/DL (ref 70–99)
GLUCOSE BLD-MCNC: 152 MG/DL (ref 70–99)
GLUCOSE URINE: NEGATIVE MG/DL
HCT VFR BLD CALC: 40 % (ref 40.5–52.5)
HEMOGLOBIN: 13.1 G/DL (ref 13.5–17.5)
HYALINE CASTS: 0 /LPF (ref 0–8)
INR BLD: 4.06 (ref 0.86–1.14)
KETONES, URINE: NEGATIVE MG/DL
LACTIC ACID: 0.8 MMOL/L (ref 0.4–2)
LEUKOCYTE ESTERASE, URINE: NEGATIVE
LIPASE: 71 U/L (ref 13–60)
LYMPHOCYTES ABSOLUTE: 2.5 K/UL (ref 1–5.1)
LYMPHOCYTES RELATIVE PERCENT: 21 %
MCH RBC QN AUTO: 30.8 PG (ref 26–34)
MCHC RBC AUTO-ENTMCNC: 32.9 G/DL (ref 31–36)
MCV RBC AUTO: 93.7 FL (ref 80–100)
MICROSCOPIC EXAMINATION: YES
MONOCYTES ABSOLUTE: 0.9 K/UL (ref 0–1.3)
MONOCYTES RELATIVE PERCENT: 7.6 %
NEUTROPHILS ABSOLUTE: 8.4 K/UL (ref 1.7–7.7)
NEUTROPHILS RELATIVE PERCENT: 69.1 %
NITRITE, URINE: NEGATIVE
PDW BLD-RTO: 15.8 % (ref 12.4–15.4)
PERFORMED ON: ABNORMAL
PH UA: 5.5 (ref 5–8)
PLATELET # BLD: 203 K/UL (ref 135–450)
PMV BLD AUTO: 11.1 FL (ref 5–10.5)
POTASSIUM REFLEX MAGNESIUM: 5 MMOL/L (ref 3.5–5.1)
PRO-BNP: 4409 PG/ML (ref 0–449)
PROTEIN UA: 100 MG/DL
PROTHROMBIN TIME: 47.8 SEC (ref 10–13.2)
RBC # BLD: 4.27 M/UL (ref 4.2–5.9)
RBC UA: 9 /HPF (ref 0–4)
SODIUM BLD-SCNC: 138 MMOL/L (ref 136–145)
SPECIFIC GRAVITY UA: 1.01 (ref 1–1.03)
TOTAL PROTEIN: 6.8 G/DL (ref 6.4–8.2)
TROPONIN: 0.03 NG/ML
URINE REFLEX TO CULTURE: ABNORMAL
URINE TYPE: ABNORMAL
UROBILINOGEN, URINE: 0.2 E.U./DL
WBC # BLD: 12.1 K/UL (ref 4–11)
WBC UA: 6 /HPF (ref 0–5)

## 2020-06-09 PROCEDURE — 99285 EMERGENCY DEPT VISIT HI MDM: CPT

## 2020-06-09 PROCEDURE — 80053 COMPREHEN METABOLIC PANEL: CPT

## 2020-06-09 PROCEDURE — 85025 COMPLETE CBC W/AUTO DIFF WBC: CPT

## 2020-06-09 PROCEDURE — 83690 ASSAY OF LIPASE: CPT

## 2020-06-09 PROCEDURE — 84484 ASSAY OF TROPONIN QUANT: CPT

## 2020-06-09 PROCEDURE — 2060000000 HC ICU INTERMEDIATE R&B

## 2020-06-09 PROCEDURE — 81001 URINALYSIS AUTO W/SCOPE: CPT

## 2020-06-09 PROCEDURE — 74176 CT ABD & PELVIS W/O CONTRAST: CPT

## 2020-06-09 PROCEDURE — 2580000003 HC RX 258: Performed by: PHYSICIAN ASSISTANT

## 2020-06-09 PROCEDURE — 83605 ASSAY OF LACTIC ACID: CPT

## 2020-06-09 PROCEDURE — 71045 X-RAY EXAM CHEST 1 VIEW: CPT

## 2020-06-09 PROCEDURE — 36415 COLL VENOUS BLD VENIPUNCTURE: CPT

## 2020-06-09 PROCEDURE — 85730 THROMBOPLASTIN TIME PARTIAL: CPT

## 2020-06-09 PROCEDURE — 2580000003 HC RX 258: Performed by: HOSPITALIST

## 2020-06-09 PROCEDURE — 96360 HYDRATION IV INFUSION INIT: CPT

## 2020-06-09 PROCEDURE — 85610 PROTHROMBIN TIME: CPT

## 2020-06-09 PROCEDURE — 51702 INSERT TEMP BLADDER CATH: CPT

## 2020-06-09 PROCEDURE — 83036 HEMOGLOBIN GLYCOSYLATED A1C: CPT

## 2020-06-09 PROCEDURE — 93005 ELECTROCARDIOGRAM TRACING: CPT | Performed by: EMERGENCY MEDICINE

## 2020-06-09 PROCEDURE — 2580000003 HC RX 258: Performed by: EMERGENCY MEDICINE

## 2020-06-09 PROCEDURE — 87040 BLOOD CULTURE FOR BACTERIA: CPT

## 2020-06-09 PROCEDURE — 6370000000 HC RX 637 (ALT 250 FOR IP): Performed by: HOSPITALIST

## 2020-06-09 PROCEDURE — 96361 HYDRATE IV INFUSION ADD-ON: CPT

## 2020-06-09 PROCEDURE — 83880 ASSAY OF NATRIURETIC PEPTIDE: CPT

## 2020-06-09 RX ORDER — ONDANSETRON 2 MG/ML
4 INJECTION INTRAMUSCULAR; INTRAVENOUS EVERY 6 HOURS PRN
Status: DISCONTINUED | OUTPATIENT
Start: 2020-06-09 | End: 2020-06-17 | Stop reason: HOSPADM

## 2020-06-09 RX ORDER — SENNA AND DOCUSATE SODIUM 50; 8.6 MG/1; MG/1
1 TABLET, FILM COATED ORAL 2 TIMES DAILY
Status: DISCONTINUED | OUTPATIENT
Start: 2020-06-09 | End: 2020-06-10

## 2020-06-09 RX ORDER — DEXTROSE MONOHYDRATE 25 G/50ML
12.5 INJECTION, SOLUTION INTRAVENOUS PRN
Status: DISCONTINUED | OUTPATIENT
Start: 2020-06-09 | End: 2020-06-17 | Stop reason: HOSPADM

## 2020-06-09 RX ORDER — INSULIN LISPRO 100 [IU]/ML
0-6 INJECTION, SOLUTION INTRAVENOUS; SUBCUTANEOUS EVERY 4 HOURS
Status: DISCONTINUED | OUTPATIENT
Start: 2020-06-09 | End: 2020-06-09

## 2020-06-09 RX ORDER — INSULIN LISPRO 100 [IU]/ML
0-6 INJECTION, SOLUTION INTRAVENOUS; SUBCUTANEOUS
Status: DISCONTINUED | OUTPATIENT
Start: 2020-06-09 | End: 2020-06-17 | Stop reason: HOSPADM

## 2020-06-09 RX ORDER — QUETIAPINE FUMARATE 25 MG/1
25 TABLET, FILM COATED ORAL DAILY
Status: DISCONTINUED | OUTPATIENT
Start: 2020-06-10 | End: 2020-06-10

## 2020-06-09 RX ORDER — 0.9 % SODIUM CHLORIDE 0.9 %
1000 INTRAVENOUS SOLUTION INTRAVENOUS ONCE
Status: COMPLETED | OUTPATIENT
Start: 2020-06-09 | End: 2020-06-09

## 2020-06-09 RX ORDER — MEMANTINE HYDROCHLORIDE 10 MG/1
10 TABLET ORAL 2 TIMES DAILY
Status: ON HOLD | COMMUNITY
End: 2020-06-17 | Stop reason: HOSPADM

## 2020-06-09 RX ORDER — ONDANSETRON 4 MG/1
4 TABLET, ORALLY DISINTEGRATING ORAL EVERY 8 HOURS PRN
Status: DISCONTINUED | OUTPATIENT
Start: 2020-06-09 | End: 2020-06-17 | Stop reason: HOSPADM

## 2020-06-09 RX ORDER — INSULIN LISPRO 100 [IU]/ML
0-3 INJECTION, SOLUTION INTRAVENOUS; SUBCUTANEOUS NIGHTLY
Status: DISCONTINUED | OUTPATIENT
Start: 2020-06-09 | End: 2020-06-17 | Stop reason: HOSPADM

## 2020-06-09 RX ORDER — NICOTINE POLACRILEX 4 MG
15 LOZENGE BUCCAL PRN
Status: DISCONTINUED | OUTPATIENT
Start: 2020-06-09 | End: 2020-06-17 | Stop reason: HOSPADM

## 2020-06-09 RX ORDER — ATENOLOL 25 MG/1
25 TABLET ORAL DAILY
Status: DISCONTINUED | OUTPATIENT
Start: 2020-06-09 | End: 2020-06-17 | Stop reason: HOSPADM

## 2020-06-09 RX ORDER — TAMSULOSIN HYDROCHLORIDE 0.4 MG/1
0.4 CAPSULE ORAL DAILY
Status: DISCONTINUED | OUTPATIENT
Start: 2020-06-09 | End: 2020-06-17 | Stop reason: HOSPADM

## 2020-06-09 RX ORDER — CITALOPRAM 10 MG/1
30 TABLET ORAL DAILY
Status: ON HOLD | COMMUNITY
End: 2020-06-17 | Stop reason: HOSPADM

## 2020-06-09 RX ORDER — INSULIN LISPRO 100 [IU]/ML
0.08 INJECTION, SOLUTION INTRAVENOUS; SUBCUTANEOUS
Status: DISCONTINUED | OUTPATIENT
Start: 2020-06-09 | End: 2020-06-10

## 2020-06-09 RX ORDER — OMEPRAZOLE 40 MG/1
40 CAPSULE, DELAYED RELEASE ORAL DAILY
COMMUNITY

## 2020-06-09 RX ORDER — ACETAMINOPHEN 325 MG/1
650 TABLET ORAL EVERY 6 HOURS PRN
Status: DISCONTINUED | OUTPATIENT
Start: 2020-06-09 | End: 2020-06-17 | Stop reason: HOSPADM

## 2020-06-09 RX ORDER — SODIUM CHLORIDE 9 MG/ML
INJECTION, SOLUTION INTRAVENOUS CONTINUOUS
Status: DISCONTINUED | OUTPATIENT
Start: 2020-06-09 | End: 2020-06-11

## 2020-06-09 RX ORDER — DEXTROSE MONOHYDRATE 50 MG/ML
100 INJECTION, SOLUTION INTRAVENOUS PRN
Status: DISCONTINUED | OUTPATIENT
Start: 2020-06-09 | End: 2020-06-17 | Stop reason: HOSPADM

## 2020-06-09 RX ORDER — ACETAMINOPHEN 650 MG/1
650 SUPPOSITORY RECTAL EVERY 6 HOURS PRN
Status: DISCONTINUED | OUTPATIENT
Start: 2020-06-09 | End: 2020-06-17 | Stop reason: HOSPADM

## 2020-06-09 RX ORDER — QUETIAPINE FUMARATE 25 MG/1
50 TABLET, FILM COATED ORAL DAILY
Status: DISCONTINUED | OUTPATIENT
Start: 2020-06-09 | End: 2020-06-09

## 2020-06-09 RX ORDER — PANTOPRAZOLE SODIUM 40 MG/1
40 TABLET, DELAYED RELEASE ORAL
Status: DISCONTINUED | OUTPATIENT
Start: 2020-06-10 | End: 2020-06-17 | Stop reason: HOSPADM

## 2020-06-09 RX ORDER — QUETIAPINE FUMARATE 50 MG/1
50 TABLET, FILM COATED ORAL DAILY
Status: ON HOLD | COMMUNITY
End: 2020-06-17 | Stop reason: HOSPADM

## 2020-06-09 RX ORDER — ATORVASTATIN CALCIUM 40 MG/1
40 TABLET, FILM COATED ORAL DAILY
Status: DISCONTINUED | OUTPATIENT
Start: 2020-06-09 | End: 2020-06-17 | Stop reason: HOSPADM

## 2020-06-09 RX ADMIN — DESMOPRESSIN ACETATE 40 MG: 0.2 TABLET ORAL at 21:45

## 2020-06-09 RX ADMIN — ATENOLOL 25 MG: 25 TABLET ORAL at 21:45

## 2020-06-09 RX ADMIN — SODIUM CHLORIDE 1000 ML: 9 INJECTION, SOLUTION INTRAVENOUS at 15:34

## 2020-06-09 RX ADMIN — TAMSULOSIN HYDROCHLORIDE 0.4 MG: 0.4 CAPSULE ORAL at 21:44

## 2020-06-09 RX ADMIN — SODIUM CHLORIDE: 9 INJECTION, SOLUTION INTRAVENOUS at 21:44

## 2020-06-09 RX ADMIN — SENNOSIDES AND DOCUSATE SODIUM 1 TABLET: 8.6; 5 TABLET ORAL at 21:45

## 2020-06-09 ASSESSMENT — PAIN SCALES - GENERAL: PAINLEVEL_OUTOF10: 0

## 2020-06-09 NOTE — ED PROVIDER NOTES
[facility]        Pt Name: Juan Jose Davila  MRN: [id number]  [place] 1935  Date of evaluation: 6/9/2020  Provider: Alexandria Marx PA-C  PCP: Bessy Hui MD     I have seen and evaluated this patient with my supervising physician Merl Fothergill, MD.    [facility]       Chief Complaint   Patient presents with    Fatigue     Patient arrives via medic with c.c of lethargy. Patient was admitted to a psychiatric facility in [place] per patients family he has not acted right since he has been home. Per medics family did not state any new medication use that they are aware of . Patient is alert to name. Patient is disoriented to time, place, and situation. Resp e.u and skin warm and dry. HISTORY OF PRESENT ILLNESS   (Location, Timing/Onset, Context/Setting, Quality, Duration, Modifying Factors, Severity, Associated Signs and Symptoms)  Note limiting factors. Juan Jose Davila is a 82 y.o. male who presents for evaluation of lethargy, increased confusion and altered mental status. Family reports the patient was recently discharged from an adult geriatric psych facility a couple of days ago and has not been acting right since his been home. They deny any known new medications. He is completely disoriented to person place time and situation on my examination. He does wince when I press on his abdomen. No additional information is able to obtained at this time. Nursing Notes were all reviewed and agreed with or any disagreements were addressed in the HPI. REVIEW OF SYSTEMS    (2-9 systems for level 4, 10 or more for level 5)     Review of Systems   Unable to perform ROS: Mental status change   Constitutional: Positive for fatigue. Neurological: Positive for weakness. Psychiatric/Behavioral: Positive for confusion. Positives and Pertinent negatives as per HPI.   Except as noted above in the ROS, all other systems were reviewed and negative. PAST MEDICAL HISTORY     Past Medical History:   Diagnosis Date    A-fib [facility])     Arthritis     CKD (chronic kidney disease) stage 2, GFR 60-89 ml/min 12/22/2018    Diabetes mellitus ([address]     GERD (gastroesophageal reflux disease) 12/22/2018    High blood pressure     Hyperlipidemia     Osteoarthritis     Type II or unspecified type diabetes mellitus without mention of complication, not stated as uncontrolled     Vitamin D deficiency          SURGICAL HISTORY     Past Surgical History:   Procedure Laterality Date    BACK SURGERY  1979    BACK SURGERY      low disc    ELBOW SURGERY  1988    Right    KNEE ARTHROSCOPY  2000    Right    KNEE ARTHROSCOPY  2001    Left         CURRENTMEDICATIONS       Previous Medications    ATENOLOL (TENORMIN) 50 MG TABLET    Take 1 tablet by mouth daily    ATORVASTATIN (LIPITOR) 40 MG TABLET    Take 1 tablet by mouth daily    CALCIUM CARBONATE-VITAMIN D (CALCIUM 600 + D) 600-400 MG-UNIT TABS PER TAB    Take 1 tablet by mouth 2 times daily    CITALOPRAM (CELEXA) 10 MG TABLET    Take 30 mg by mouth daily    DIPHENOXYLATE-ATROPINE (LOMOTIL) 2.5-0.025 MG PER TABLET    Take 1 tablet by mouth 3 times daily as needed for Diarrhea. .    DONEPEZIL (ARICEPT) 5 MG TABLET    Take 10 mg by mouth nightly     FOLBIC 2.5-25-2 MG TABS    TAKE 1 TABLET BY MOUTH DAILY.     GEMFIBROZIL (LOPID) 600 MG TABLET    Take 1 tablet by mouth 2 times daily    MAGNESIUM OXIDE (MAG-OX) 400 MG TABLET    Take 400 mg by mouth daily    MEMANTINE (NAMENDA) 10 MG TABLET    Take 10 mg by mouth 2 times daily    OMEPRAZOLE (PRILOSEC) 40 MG DELAYED RELEASE CAPSULE    Take 40 mg by mouth daily    QUETIAPINE (SEROQUEL) 50 MG TABLET    Take 50 mg by mouth daily    SITAGLIPTIN (JANUVIA) 100 MG TABLET    Take 100 mg by mouth daily    TAMSULOSIN (FLOMAX) 0.4 MG CAPSULE    Take 1 capsule by mouth daily    VITAMIN D (ERGOCALCIFEROL) 10133 UNITS CAPS CAPSULE    TAKE 1 states that pt was recently diagnosed with moderate dementia. Spn noticed over the last few days pt was was becoming more aggressIve/sleeping less/refusing to eat and stating that he wantd to harm himself with a plan to shoot himself. Per son pt does not have access to a gun and has no hx of self harm or SI. Pt also is scheduled to have am MRI done this afternoon) Acuity: Acute Type of Exam: Initial FINDINGS: There is new subtle right lower lobe airspace disease concerning for pneumonia. No change in the bibasilar scarring. Cardiomegaly is stable. The aorta is tortuous. There is no pneumothorax or pleural effusion. 1. Subtle right lower lobe airspace disease concerning for pneumonia. Recommend chest radiograph in 8 weeks to confirm resolution. PROCEDURES   Unless otherwise noted below, none     Procedures    CRITICAL CARE TIME   N/A    CONSULTS:  IP CONSULT TO HOSPITALIST  IP CONSULT TO NEPHROLOGY      EMERGENCY DEPARTMENT COURSE and DIFFERENTIAL DIAGNOSIS/MDM:   Vitals:    Vitals:    06/09/20 1430 06/09/20 1445 06/09/20 1500 06/09/20 1545   BP: (!) 125/56 118/80 101/75 (!) 131/48   Pulse: 77 81 79 76   Resp: 19 25 21 15   Temp:       TempSrc:       SpO2: 100% 99% 99% 96%   Weight:       Height:           Patient was given the following medications:  Medications   0.9 % sodium chloride bolus (1,000 mLs Intravenous New Bag 6/9/20 1534)   0.9 % sodium chloride bolus (1,000 mLs Intravenous New Bag 6/9/20 1534)       Patient presents for evaluation of altered mental status and lethargy. On exam, patient is ill-appearing, drowsy but arousable, nontoxic in appearance. Vitals are stable and he is afebrile. He is disoriented to person place time and situation. GCS 15. He is moving all extremities without noted focal neurologic deficit. No facial asymmetry. He does have some slurred and confused speech.   We have no last known normal.  Decreased inspiratory effort but lungs appear clear to auscultation

## 2020-06-09 NOTE — ED NOTES
Dana Clemons RN called for no delay report. RN coming to ER for patient in a few minutes.       Rozina Davis, KI  06/09/20 5713

## 2020-06-10 LAB
ANION GAP SERPL CALCULATED.3IONS-SCNC: 14 MMOL/L (ref 3–16)
BASOPHILS ABSOLUTE: 0 K/UL (ref 0–0.2)
BASOPHILS RELATIVE PERCENT: 0.4 %
BUN BLDV-MCNC: 90 MG/DL (ref 7–20)
CALCIUM SERPL-MCNC: 8.1 MG/DL (ref 8.3–10.6)
CHLORIDE BLD-SCNC: 112 MMOL/L (ref 99–110)
CO2: 19 MMOL/L (ref 21–32)
CREAT SERPL-MCNC: 4.8 MG/DL (ref 0.8–1.3)
EKG ATRIAL RATE: 82 BPM
EKG DIAGNOSIS: NORMAL
EKG Q-T INTERVAL: 392 MS
EKG QRS DURATION: 92 MS
EKG QTC CALCULATION (BAZETT): 441 MS
EKG R AXIS: 30 DEGREES
EKG T AXIS: 35 DEGREES
EKG VENTRICULAR RATE: 76 BPM
EOSINOPHILS ABSOLUTE: 0.1 K/UL (ref 0–0.6)
EOSINOPHILS RELATIVE PERCENT: 1.1 %
ESTIMATED AVERAGE GLUCOSE: 154.2 MG/DL
GFR AFRICAN AMERICAN: 14
GFR NON-AFRICAN AMERICAN: 12
GLUCOSE BLD-MCNC: 103 MG/DL (ref 70–99)
GLUCOSE BLD-MCNC: 123 MG/DL (ref 70–99)
GLUCOSE BLD-MCNC: 126 MG/DL (ref 70–99)
GLUCOSE BLD-MCNC: 127 MG/DL (ref 70–99)
GLUCOSE BLD-MCNC: 133 MG/DL (ref 70–99)
GLUCOSE BLD-MCNC: 149 MG/DL (ref 70–99)
HBA1C MFR BLD: 7 %
HCT VFR BLD CALC: 35.9 % (ref 40.5–52.5)
HEMOGLOBIN: 11.9 G/DL (ref 13.5–17.5)
INR BLD: 3.73 (ref 0.86–1.14)
LYMPHOCYTES ABSOLUTE: 2 K/UL (ref 1–5.1)
LYMPHOCYTES RELATIVE PERCENT: 19.7 %
MCH RBC QN AUTO: 30.9 PG (ref 26–34)
MCHC RBC AUTO-ENTMCNC: 33 G/DL (ref 31–36)
MCV RBC AUTO: 93.5 FL (ref 80–100)
MONOCYTES ABSOLUTE: 0.9 K/UL (ref 0–1.3)
MONOCYTES RELATIVE PERCENT: 8.8 %
NEUTROPHILS ABSOLUTE: 7.3 K/UL (ref 1.7–7.7)
NEUTROPHILS RELATIVE PERCENT: 70 %
PDW BLD-RTO: 15.4 % (ref 12.4–15.4)
PERFORMED ON: ABNORMAL
PLATELET # BLD: 181 K/UL (ref 135–450)
PMV BLD AUTO: 10.7 FL (ref 5–10.5)
POTASSIUM REFLEX MAGNESIUM: 3.8 MMOL/L (ref 3.5–5.1)
PROTHROMBIN TIME: 43.8 SEC (ref 10–13.2)
RBC # BLD: 3.84 M/UL (ref 4.2–5.9)
SODIUM BLD-SCNC: 145 MMOL/L (ref 136–145)
TSH REFLEX FT4: 0.68 UIU/ML (ref 0.27–4.2)
WBC # BLD: 10.4 K/UL (ref 4–11)

## 2020-06-10 PROCEDURE — 97535 SELF CARE MNGMENT TRAINING: CPT

## 2020-06-10 PROCEDURE — 93010 ELECTROCARDIOGRAM REPORT: CPT | Performed by: INTERNAL MEDICINE

## 2020-06-10 PROCEDURE — 80048 BASIC METABOLIC PNL TOTAL CA: CPT

## 2020-06-10 PROCEDURE — 84443 ASSAY THYROID STIM HORMONE: CPT

## 2020-06-10 PROCEDURE — 87040 BLOOD CULTURE FOR BACTERIA: CPT

## 2020-06-10 PROCEDURE — 97166 OT EVAL MOD COMPLEX 45 MIN: CPT

## 2020-06-10 PROCEDURE — 6370000000 HC RX 637 (ALT 250 FOR IP): Performed by: HOSPITALIST

## 2020-06-10 PROCEDURE — 85025 COMPLETE CBC W/AUTO DIFF WBC: CPT

## 2020-06-10 PROCEDURE — 97530 THERAPEUTIC ACTIVITIES: CPT

## 2020-06-10 PROCEDURE — 94760 N-INVAS EAR/PLS OXIMETRY 1: CPT

## 2020-06-10 PROCEDURE — 2060000000 HC ICU INTERMEDIATE R&B

## 2020-06-10 PROCEDURE — 85610 PROTHROMBIN TIME: CPT

## 2020-06-10 PROCEDURE — 97162 PT EVAL MOD COMPLEX 30 MIN: CPT

## 2020-06-10 RX ORDER — SENNA AND DOCUSATE SODIUM 50; 8.6 MG/1; MG/1
1 TABLET, FILM COATED ORAL DAILY PRN
Status: DISCONTINUED | OUTPATIENT
Start: 2020-06-10 | End: 2020-06-17 | Stop reason: HOSPADM

## 2020-06-10 RX ADMIN — QUETIAPINE FUMARATE 25 MG: 25 TABLET ORAL at 09:28

## 2020-06-10 RX ADMIN — SENNOSIDES AND DOCUSATE SODIUM 1 TABLET: 8.6; 5 TABLET ORAL at 09:28

## 2020-06-10 RX ADMIN — DESMOPRESSIN ACETATE 40 MG: 0.2 TABLET ORAL at 09:28

## 2020-06-10 RX ADMIN — ATENOLOL 25 MG: 25 TABLET ORAL at 09:28

## 2020-06-10 RX ADMIN — TAMSULOSIN HYDROCHLORIDE 0.4 MG: 0.4 CAPSULE ORAL at 09:28

## 2020-06-10 RX ADMIN — PANTOPRAZOLE SODIUM 40 MG: 40 TABLET, DELAYED RELEASE ORAL at 05:57

## 2020-06-10 NOTE — PROGRESS NOTES
Office : [phone]     Fax :[phone]       Nephrology  Note        Chief Complaint:    Chief Complaint   Patient presents with    Fatigue     Patient arrives via medic with c.c of lethargy. Patient was admitted to a psychiatric facility in [place] per patients family he has not acted right since he has been home. Per medics family did not state any new medication use that they are aware of . Patient is alert to name. Patient is disoriented to time, place, and situation. Resp e.u and skin warm and dry. History of Present Ilness:    Antonia Jimenez is a 82 y.o. male with h/o atrial fib, DM2 , HTN, OA, was brought to the ED for evaluation of increased lethargy, confusion and altered mental status since his discharge from a geriatric psychiatric facility     His son reports that he has recently been diagnosed with dementia and that he was not initiated on any new medications following discharge from his psychiatric stay. Labs were obtained and notable for a severe acute kidney injury with a creatinine of 5.4 and high anion gap metabolic acidosis. He was noted to have urinary retention. Jimenez catheter was placed in ED and resulted in drainage of 1.5 L urine. He is awake but confused.    Vitals stable     Interval HX     Seen and examined  Awake  UOP better   Has jimenez   Creatinine slightly improved         Past Medical History:   Diagnosis Date    [facility]     Arthritis     CKD (chronic kidney disease) stage 2, GFR 60-89 ml/min 12/22/2018    Diabetes mellitus ([facility]     GERD (gastroesophageal reflux disease) 12/22/2018    High blood pressure     Hyperlipidemia     Osteoarthritis     Type II or unspecified type diabetes mellitus without mention of m/r/g  Respiratory: CTA B without w/r/r  Abdomen:  +bs, soft, nt, nd  Ext: no  lower extremity edema    Labs:  CBC:   Recent Labs     06/09/20  1359 06/10/20  0452   WBC 12.1* 10.4   HGB 13.1* 11.9*    181     BMP:    Recent Labs     06/09/20  1359 06/10/20  0452    145   K 5.0 3.8    112*   CO2 17* 19*   BUN 96* 90*   CREATININE 5.4* 4.8*   GLUCOSE 152* 123*     Ca/Mg/Phos:   Recent Labs     06/09/20  1359 06/10/20  0452   CALCIUM 8.4 8.1*     Hepatic:   Recent Labs     06/09/20  1359   AST 43*   ALT 23   BILITOT 0.3   ALKPHOS 93     Troponin:   Recent Labs     06/09/20  1359   TROPONINI 0.03*     BNP: No results for input(s): BNP in the last 72 hours. Lipids: No results for input(s): CHOL, TRIG, HDL, LDLCALC, LABVLDL in the last 72 hours. ABGs: No results for input(s): PHART, PO2ART, PEB6CDR in the last 72 hours. INR:   Recent Labs     06/09/20  1359 06/10/20  0453   INR 4.06* 3.73*     UA:  Recent Labs     06/09/20  1359   COLORU DK YELLOW   CLARITYU Clear   GLUCOSEU Negative   BILIRUBINUR Negative   KETUA Negative   SPECGRAV 1.014   BLOODU SMALL*   PHUR 5.5   PROTEINU 100*   UROBILINOGEN 0.2   NITRU Negative   LEUKOCYTESUR Negative   LABMICR YES   URINETYPE Voided      Urine Microscopic:   Recent Labs     06/09/20  1359   HYALCAST 0   WBCUA 6*   RBCUA 9*   EPIU 0     Urine Culture: No results for input(s): LABURIN in the last 72 hours. Urine Chemistry: No results for input(s): Glendell Lobo, PROTEINUR, NAUR in the last 72 hours. IMAGING:  XR CHEST PORTABLE   Final Result   Stable moderate cardiomegaly. CT ABDOMEN PELVIS WO CONTRAST   Final Result   Subtle injection of the fat surrounds the bladder. Recommend correlation   with urinalysis to exclude cystitis. There is prostatomegaly      Punctate calcifications in the right and left kidney, either vascular or   nonobstructing renal stones.   No hydronephrosis      Cholelithiasis               Assessment/Plan :      1.

## 2020-06-10 NOTE — PROGRESS NOTES
06/09/20  1359 06/10/20  0452   WBC 12.1* 10.4   HGB 13.1* 11.9*   HCT 40.0* 35.9*   MCV 93.7 93.5    181     BMP:   Recent Labs     06/09/20  1359 06/10/20  0452    145   K 5.0 3.8    112*   CO2 17* 19*   BUN 96* 90*   CREATININE 5.4* 4.8*     Mag: No results for input(s): MAG in the last 72 hours. Phos: No results found for: PHOS  No components found for: GLU    LIVER PROFILE:   Recent Labs     06/09/20  1359   AST 43*   ALT 23   LIPASE 71.0*   BILITOT 0.3   ALKPHOS 93     PT/INR:   Recent Labs     06/09/20  1359 06/10/20  0453   PROTIME 47.8* 43.8*   INR 4.06* 3.73*     APTT:   Recent Labs     06/09/20  1359   APTT 37.7*     UA:  Recent Labs     06/09/20  1359   COLORU DK YELLOW   PHUR 5.5   WBCUA 6*   RBCUA 9*   CLARITYU Clear   SPECGRAV 1.014   LEUKOCYTESUR Negative   UROBILINOGEN 0.2   BILIRUBINUR Negative   BLOODU SMALL*   GLUCOSEU Negative       Invalid input(s): ABG  Lab Results   Component Value Date    CALCIUM 8.1 (L) 06/10/2020       Assessment:    Principal Problem:    Acute kidney injury superimposed on chronic kidney disease ([facility]  Active Problems:    Diabetes mellitus ([facility]    Hypertension    Acute retention of urine    Altered mental status  Resolved Problems:    * No resolved hospital problems. [facility] course: an 83 yo male with paf on coumadin, ckd III, dm, admitted with confusion and altered mental status since discharge from a geriatric psychiatric facility several days prior to admission. His son reports that he has recently been diagnosed with dementia and that he was not initiated on any new medications following discharge from his psychiatric stay. Labs were obtained and notable for a severe acute kidney injury with a creatinine of 5.4 and high anion gap metabolic acidosis. Upon further examination, the patient was noted to have a large distended palpable bladder extending into his upper abdominal field.   Rosas catheter was placed in ED and resulted in drainage of 1.5 L of rust colored urine. Nephrology was consulted and hospitalist team contacted to admit patient for close observation and treatment of his ROSE. per son, over the past few months he has been having visual hallucinations and worsening confusion. He has been lethargic, not speaking or moving much since discharge a week ago. He walked with a walker or cane by himself before this week.       Plan:  ROSE on ckd III  - crt baseline 1-1.2, 5.4 on admission -> 4.8  - due to obstructive nephropathy   - IVF  - Avoid nephrotoxins  - nephrology consulted    Urinary retention   - due to bph  - jimenez placed  - cont flomax   - hold seroquel for now  - urology consulted    Acute metabolic encephalopathy  - due to uremia  - head CT negative  - check TSH  - treat associated conditions  - avoid sedating meds as able  - supportive care    Diabetes  - stable  - hold home PO meds  - reduce lantus, hold prandial as he is not eating, adjust when he wakes up to eat  - SSI, accuchecks    paf   - currently rate controlled  - continue bb   - On Coumadin; monitor daily PT/INR, today 3.7    Dementia  - pt is at very high risk for delirium, will minimize sedating meds, remove jimenez as soon as able, avoid constipation, control pain, frequent reorientation   - PT/OT when he is able to participate     Code status: limited, dnr/dni  DVT prophylaxis: [] Lovenox  [] SQ Heparin  [] SCDs because of  [x] warfarin/oral direct thrombin inhibitor [] Encourage ambulation      Disposition:  [] Home [] Rehab [] Psych [] SNF  [] LTAC  [] Transfer to ICU  [] Transfer to PCU [] Other: in pt      Electronically signed by Kelsea Rm DO on 6/10/2020 at 9:00 AM

## 2020-06-10 NOTE — PROGRESS NOTES
Occupational Therapy   Occupational Therapy Initial Assessment  Date: 6/10/2020   Patient Name: Antonia Rosas  MRN: [id number]     : 1935    Date of Service: 6/10/2020    Discharge Recommendations:Corey Woods scored a  on the AM-PAC ADL Inpatient form. Current research shows that an AM-PAC score of 17 or less is typically not associated with a discharge to the patient's home setting. Based on the patient's AM-PAC score and their current ADL deficits, it is recommended that the patient have 3-5 sessions per week of Occupational Therapy at d/c to increase the patient's independence. At this time, this patient lacks the endurance, and/or tolerance for 3 hours of therapy/day, 5-7x/wk and would benefit most from a follow up treatment frequency of 3-5x/wk. Please see assessment section for further patient specific details. If patient discharges prior to next session this note will serve as a discharge summary. Please see below for the latest assessment towards goals. OT Equipment Recommendations  Equipment Needed: No  Other: defer to next level    Assessment   Performance deficits / Impairments: Decreased functional mobility ; Decreased ADL status; Decreased cognition;Decreased safe awareness;Decreased endurance;Decreased balance  Assessment: Patient presents with the above deficits, which are below baseline, and would benefit from continued skilled OT to address  Treatment Diagnosis: Decreased ADLs, IADLs, transfers, mobility associated with ROSE  Prognosis: Fair;Guarded  Decision Making: Medium Complexity  History: Lives with family, 24 hour supervision, ambulatory x 1 week ago per son  Exam: as above  Assistance / Modification: Dep of 2, lethargy  OT Education: OT Role;Plan of Care;Family Education  Patient Education: Eval, discharge recommendations-patient's son verbalized understanding  REQUIRES OT FOLLOW UP: Yes  Activity Tolerance  Activity Tolerance: Treatment limited secondary to decreased cognition;Patient limited by fatigue  Safety Devices  Safety Devices in place: Yes  Type of devices: All fall risk precautions in place; Bed alarm in place;Call light within reach; Patient at risk for falls;Nurse notified; Left in bed           Patient Diagnosis(es): The primary encounter diagnosis was ROSE (acute kidney injury) ([facility] Diagnoses of Retention of urine and Altered mental status, unspecified altered mental status type were also pertinent to this visit. has a past medical history of A-fib ([facility] [address] Arthritis, CKD (chronic kidney disease) stage 2, GFR 60-89 ml/min, Diabetes mellitus ([facility] [address] GERD (gastroesophageal reflux disease), High blood pressure, Hyperlipidemia, Osteoarthritis, Type II or unspecified type diabetes mellitus without mention of complication, not stated as uncontrolled, and Vitamin D deficiency. has a past surgical history that includes Knee arthroscopy (2000); Knee arthroscopy (2001); Elbow surgery (1988); back surgery (1979); and back surgery. Treatment Diagnosis: Decreased ADLs, IADLs, transfers, mobility associated with ROSE      Restrictions  Restrictions/Precautions  Restrictions/Precautions: Fall Risk, Modified Diet(High fall risk, renal diet, dysphagia soft and bite sized)  Position Activity Restriction  Other position/activity restrictions: Naa Frazier is a 82 y.o. male who presents for evaluation of lethargy, increased confusion and altered mental status. Family reports the patient was recently discharged from an adult geriatric psych facility a couple of days ago and has not been acting right since his been home. They deny any known new medications.     Subjective   General  Chart Reviewed: Yes  Family / Caregiver Present: No  Diagnosis: ROSE  Subjective  Subjective: Patient supine in bed, lethargic and unable to follow commands   Patient Currently in Pain: Denies  Pre Treatment Pain Screening  Intervention List: Patient able to continue with treatment  Vital

## 2020-06-10 NOTE — CONSULTS
Office : [phone]     Fax :[phone]       Nephrology Consult Note      Patient's Name: Ammy Michelle  10:03 PM  6/9/2020    Reason for Consult: ROSE      Requesting Physician:  Kaur Steiner MD      Chief Complaint:    Chief Complaint   Patient presents with    Fatigue     Patient arrives via medic with c.c of lethargy. Patient was admitted to a psychiatric facility in [place] per patients family he has not acted right since he has been home. Per medics family did not state any new medication use that they are aware of . Patient is alert to name. Patient is disoriented to time, place, and situation. Resp e.u and skin warm and dry. History of Present Ilness:    Ammy Michelle is a 82 y.o. male with h/o atrial fib, DM2 , HTN, OA, was brought to the ED for evaluation of increased lethargy, confusion and altered mental status since his discharge from a geriatric psychiatric facility     His son reports that he has recently been diagnosed with dementia and that he was not initiated on any new medications following discharge from his psychiatric stay. Labs were obtained and notable for a severe acute kidney injury with a creatinine of 5.4 and high anion gap metabolic acidosis. He was noted to have urinary retention. Rosas catheter was placed in ED and resulted in drainage of 1.5 L urine. He is awake but confused. Vitals stable       Prior to Admission medications    Medication Sig Start Date End Date Taking?  Authorizing Provider   citalopram (CELEXA) 10 MG tablet Take 30 mg by mouth daily   Yes Historical Provider, MD   memantine (NAMENDA) 10 MG tablet Take 10 mg by mouth 2 times daily   Yes Historical Provider, MD   omeprazole (PRILOSEC) 40 MG delayed release SURGERY      low disc    ELBOW SURGERY  1988    Right    KNEE ARTHROSCOPY  2000    Right    KNEE ARTHROSCOPY  2001    Left       Family History   Problem Relation Age of Onset    Arthritis Other     Cancer Other     Heart Disease Other     Stroke Other         reports that he has quit smoking. He has never used smokeless tobacco. He reports that he does not drink alcohol or use drugs. Allergies:  Patient has no known allergies. Current Medications:    atorvastatin (LIPITOR) tablet 40 mg, Daily  atenolol (TENORMIN) tablet 25 mg, Daily  [START ON 6/10/2020] pantoprazole (PROTONIX) tablet 40 mg, QAM AC  tamsulosin (FLOMAX) capsule 0.4 mg, Daily  glucose (GLUTOSE) 40 % oral gel 15 g, PRN  dextrose 50 % IV solution, PRN  glucagon (rDNA) injection 1 mg, PRN  dextrose 5 % solution, PRN  0.9 % sodium chloride infusion, Continuous  acetaminophen (TYLENOL) tablet 650 mg, Q6H PRN    Or  acetaminophen (TYLENOL) suppository 650 mg, Q6H PRN  sennosides-docusate sodium (SENOKOT-S) 8.6-50 MG tablet 1 tablet, BID  ondansetron (ZOFRAN-ODT) disintegrating tablet 4 mg, Q8H PRN    Or  ondansetron (ZOFRAN) injection 4 mg, Q6H PRN  insulin glargine (LANTUS;BASAGLAR) injection pen 12 Units, Nightly  insulin lispro (1 Unit Dial) 6 Units, TID WC  insulin lispro (1 Unit Dial) 0-6 Units, TID WC  insulin lispro (1 Unit Dial) 0-3 Units, Nightly  warfarin (COUMADIN) daily dosing (placeholder), RX Placeholder  [START ON 6/10/2020] QUEtiapine (SEROQUEL) tablet 25 mg, Daily        Review of Systems:   Could not be obtained , confused. Physical exam:     Vitals:  BP (!) 152/76   Pulse 73   Temp 98.1 °F (36.7 °C) (Axillary)   Resp 17   Ht 5' 7\" (1.702 m)   Wt 175 lb (79.4 kg)   SpO2 98%   BMI 27.41 kg/m²   Constitutional:  Confused.    Skin: no rash, turgor wnl  Heent:  eomi, mmm  Neck: no bruits or jvd noted  Cardiovascular:  S1, S2 without m/r/g  Respiratory: CTA B without w/r/r  Abdomen:  +bs, soft, nt, nd  Ext: no  lower

## 2020-06-10 NOTE — PROGRESS NOTES
Pt admitted to room 3378. Pt alert and oriented to person, place, and time. Pt calm and cooperative at this time. Telemetry on patient. Pt scored as a high fall risk on assessmet. Shift assessment complete. Blood glucose checked at bedtime and blood glucose was 129. Medications given crushed in pudding; pt tolerated well. Pt denies any pain at this time. Patient is a high fall risk. Patient free of falls this shift. Bed low, locked and alarmed at all times. Call light and bedside table is within reach. Yellow blanket on bed, arm band on wrist and fall sign posted in the room. Notified patient to ask for assistance when needed. Patient verbalized understanding.      Vitals:    06/09/20 2115   BP: (!) 152/76   Pulse: 73   Resp: 17   Temp: 98.1 °F (36.7 °C)   SpO2: 98%

## 2020-06-10 NOTE — PROGRESS NOTES
Physical Therapy    Facility/Department: [address]  Initial Assessment    NAME: William Woods  : 1935  MRN: [id number]    Date of Service: 6/10/2020    Discharge Recommendations: Cornelio Brown scored a /24 on the AM-PAC short mobility form. Current research shows that an AM-PAC score of 17 or less is typically not associated with a discharge to the patient's home setting. Based on the patient's AM-PAC score and their current functional mobility deficits, it is recommended that the patient have 3-5 sessions per week of Physical Therapy at d/c to increase the patient's independence. At this time, this patient lacks the endurance, and/or tolerance for 3 hours of therapy/day, 5-7x/wk and would benefit most from a follow up treatment frequency of 3-5x/wk. Please see assessment section for further patient specific details. If patient discharges prior to next session this note will serve as a discharge summary. Please see below for the latest assessment towards goals. PT Equipment Recommendations  Equipment Needed: No    Assessment   Body structures, Functions, Activity limitations: Decreased functional mobility ; Decreased ROM; Decreased strength;Decreased safe awareness;Decreased cognition;Decreased endurance;Decreased balance  Assessment: Pt presents below baseline functional mobility with the above deficits. Pt with PLOF of ambulation with walker and transfers with SBA/supervision and currently dependent for all mobility. Pt would benefit from acute PT to address deficits.    Treatment Diagnosis: impaired functional mobility  Prognosis: Fair  Decision Making: Medium Complexity  Clinical Presentation: evolving  PT Education: Goals;PT Role;Plan of Care  Patient Education: d/c recommendations--pt unable to verbalize understanding  Barriers to Learning: cognition, physical   REQUIRES PT FOLLOW UP: Yes  Activity Tolerance  Activity Tolerance: Other  Activity Tolerance: pt lethargic throughout session, limiting extent of session. Patient Diagnosis(es): The primary encounter diagnosis was ROSE (acute kidney injury) ([facility] [address] Diagnoses of Retention of urine and Altered mental status, unspecified altered mental status type were also pertinent to this visit. has a past medical history of A-fib ([facility] [address] Arthritis, CKD (chronic kidney disease) stage 2, GFR 60-89 ml/min, Diabetes mellitus ([facility] [address] GERD (gastroesophageal reflux disease), High blood pressure, Hyperlipidemia, Osteoarthritis, Type II or unspecified type diabetes mellitus without mention of complication, not stated as uncontrolled, and Vitamin D deficiency. has a past surgical history that includes Knee arthroscopy (2000); Knee arthroscopy (2001); Elbow surgery (1988); back surgery (1979); and back surgery. Restrictions  Restrictions/Precautions  Restrictions/Precautions: Fall Risk, Modified Diet(High fall risk, renal diet, dysphagia soft and bite sized)  Position Activity Restriction  Other position/activity restrictions: Antonia Rosas is a 82 y.o. male who presents for evaluation of lethargy, increased confusion and altered mental status. Family reports the patient was recently discharged from an adult geriatric psych facility a couple of days ago and has not been acting right since his been home. They deny any known new medications. Vision/Hearing  Vision: Impaired  Vision Exceptions: Wears glasses for reading;Cataracts(cataracts removed in 2013)  Hearing: Within functional limits       Subjective  General  Chart Reviewed: Yes  Response To Previous Treatment: Not applicable  Family / Caregiver Present: No  Diagnosis: ROSE  Follows Commands: Within Functional Limits  General Comment  Comments: Pt lethargic throughout session. Pt noted incontinent of stool and urine, performed bed mobility for brief change. Pt falling asleep once repositioned in bed after task.    Subjective  Subjective: Pt moaning intermittently throughout session and not providing much information despite prompts. Pt stating no pain at this time. Pain Screening  Patient Currently in Pain: Denies  Vital Signs  Patient Currently in Pain: Denies       Orientation  Orientation  Overall Orientation Status: Impaired  Orientation Level: (pt answering to name but not able to answer orientation questions)     Social/Functional History  Social/Functional History  Lives With: Family(son and DIL)  Type of Home: House  Home Layout: One level  Home Access: Stairs to enter with rails  Entrance Stairs - Number of Steps: 3 KALEE from garage  Bathroom Shower/Tub: Walk-in shower  Bathroom Toilet: Standard  Home Equipment: Cane, Standard walker(transport chair)  Receives Help From: Family  ADL Assistance: Needs assistance(assist at times for lower body dressing, socks, can put on shoes; has difficulty lifting LEs)  Homemaking Assistance: (family does homemaking)  Homemaking Responsibilities: No  Ambulation Assistance: Needs assistance(SW with assist (supervision/SBA))  Transfer Assistance: Needs assistance(SBA/Supervision)  Active : No  Occupation: Retired  Additional Comments: Called son for PLOF-son stating pt at baseline until ~1 week ago. Pt has 24/7 supervision/assist at home. Son states goals is to have pt return to baseline prior to return home and open to SNF placement.       Cognition   Cognition  Overall Cognitive Status: Exceptions  Arousal/Alertness: Inconsistent responses to stimuli  Following Commands: Does not follow commands  Attention Span: Unable to maintain attention  Initiation: Requires cues for all  Sequencing: Requires cues for all    Objective  PROM RLE (degrees)  RLE PROM: WFL  AROM RLE (degrees)  RLE AROM: Exceptions  RLE General AROM: pt unable to assess as pt not following commands  PROM LLE (degrees)  LLE PROM: WFL  AROM LLE (degrees)  LLE AROM : Exceptions  LLE General AROM: pt unable to assess as pt not following commands  Strength RLE  Strength RLE: Exception  Comment: unable to formally assess, pt not able to follow commands; no active movement performed t/o session  Strength LLE  Strength LLE: Exception  Comment: unable to formally assess, pt not able to follow commands; no active movement performed t/o session  Tone RLE  RLE Tone: Normotonic  Tone LLE  LLE Tone: Normotonic  Motor Control  Gross Motor?: WFL  Sensation  Overall Sensation Status: WFL  Bed mobility  Rolling to Left: Dependent/Total(Dx1)  Rolling to Right: Dependent/Total(Dx1)  Scooting: Dependent/Total(Dx2 boost up towards [facility])  Comment: Rolling performed for dependent pericare and brief change. Transfers  Comment: Not performed due to pt's lethargy. Ambulation  Ambulation?: No  Stairs/Curb  Stairs?: No     Balance  Posture: (unable to assess, pt remained in bed throughout session)        Plan   Plan  Times per week: 3-5x  Times per day: Daily  Current Treatment Recommendations: Strengthening, Transfer Training, Endurance Training, Neuromuscular Re-education, Balance Training, Functional Mobility Training, Gait Training, Safety Education & Training, Equipment Evaluation, Education, & procurement, Patient/Caregiver Education & Training  Safety Devices  Type of devices:  All fall risk precautions in place, Patient at risk for falls, Bed alarm in place, Call light within reach, Left in bed, Telesitter in use, Nurse notified  Restraints  Initially in place: No    G-Code       OutComes Score                                                  AM-PAC Score  AM-PAC Inpatient Mobility Raw Score : 6 (06/10/20 1310)  AM-PAC Inpatient T-Scale Score : 23.55 (06/10/20 1310)  Mobility Inpatient CMS 0-100% Score: 100 (06/10/20 1310)  Mobility Inpatient CMS G-Code Modifier : CN (06/10/20 1310)          Goals  Short term goals  Time Frame for Short term goals: upon d/c  Short term goal 1: Pt will perform bed mobility with mod Ax1  Short term goal 2: Pt will perform transfers with LRAD and mod Ax1  Short term goal 3: Pt will ambulate 8' with LRAD and mod Ax1  Patient Goals   Patient goals : pt unable to state       Therapy Time   Individual Concurrent Group Co-treatment   Time In 1017         Time Out 1057         Minutes 40              Timed Code Treatment Minutes:   25    Total Treatment Minutes:  [address], [place] [address]

## 2020-06-11 LAB
ANION GAP SERPL CALCULATED.3IONS-SCNC: 12 MMOL/L (ref 3–16)
BUN BLDV-MCNC: 75 MG/DL (ref 7–20)
CALCIUM SERPL-MCNC: 8.1 MG/DL (ref 8.3–10.6)
CHLORIDE BLD-SCNC: 115 MMOL/L (ref 99–110)
CO2: 18 MMOL/L (ref 21–32)
CREAT SERPL-MCNC: 3.5 MG/DL (ref 0.8–1.3)
GFR AFRICAN AMERICAN: 20
GFR NON-AFRICAN AMERICAN: 17
GLUCOSE BLD-MCNC: 111 MG/DL (ref 70–99)
GLUCOSE BLD-MCNC: 152 MG/DL (ref 70–99)
GLUCOSE BLD-MCNC: 190 MG/DL (ref 70–99)
GLUCOSE BLD-MCNC: 96 MG/DL (ref 70–99)
GLUCOSE BLD-MCNC: 97 MG/DL (ref 70–99)
INR BLD: 2.52 (ref 0.86–1.14)
PERFORMED ON: ABNORMAL
PERFORMED ON: ABNORMAL
PERFORMED ON: NORMAL
PERFORMED ON: NORMAL
POTASSIUM SERPL-SCNC: 3.6 MMOL/L (ref 3.5–5.1)
PROTHROMBIN TIME: 29.5 SEC (ref 10–13.2)
SODIUM BLD-SCNC: 145 MMOL/L (ref 136–145)

## 2020-06-11 PROCEDURE — 36415 COLL VENOUS BLD VENIPUNCTURE: CPT

## 2020-06-11 PROCEDURE — 2580000003 HC RX 258: Performed by: INTERNAL MEDICINE

## 2020-06-11 PROCEDURE — 2580000003 HC RX 258: Performed by: HOSPITALIST

## 2020-06-11 PROCEDURE — 97530 THERAPEUTIC ACTIVITIES: CPT

## 2020-06-11 PROCEDURE — 6370000000 HC RX 637 (ALT 250 FOR IP): Performed by: HOSPITALIST

## 2020-06-11 PROCEDURE — 2060000000 HC ICU INTERMEDIATE R&B

## 2020-06-11 PROCEDURE — 80048 BASIC METABOLIC PNL TOTAL CA: CPT

## 2020-06-11 PROCEDURE — 97535 SELF CARE MNGMENT TRAINING: CPT

## 2020-06-11 PROCEDURE — 97110 THERAPEUTIC EXERCISES: CPT

## 2020-06-11 PROCEDURE — 85610 PROTHROMBIN TIME: CPT

## 2020-06-11 PROCEDURE — 6370000000 HC RX 637 (ALT 250 FOR IP): Performed by: FAMILY MEDICINE

## 2020-06-11 PROCEDURE — 97116 GAIT TRAINING THERAPY: CPT

## 2020-06-11 RX ORDER — SODIUM CHLORIDE, SODIUM LACTATE, POTASSIUM CHLORIDE, CALCIUM CHLORIDE 600; 310; 30; 20 MG/100ML; MG/100ML; MG/100ML; MG/100ML
INJECTION, SOLUTION INTRAVENOUS CONTINUOUS
Status: DISCONTINUED | OUTPATIENT
Start: 2020-06-11 | End: 2020-06-17 | Stop reason: HOSPADM

## 2020-06-11 RX ADMIN — INSULIN GLARGINE 5 UNITS: 100 INJECTION, SOLUTION SUBCUTANEOUS at 20:28

## 2020-06-11 RX ADMIN — INSULIN LISPRO 1 UNITS: 100 INJECTION, SOLUTION INTRAVENOUS; SUBCUTANEOUS at 20:24

## 2020-06-11 RX ADMIN — ATENOLOL 25 MG: 25 TABLET ORAL at 08:38

## 2020-06-11 RX ADMIN — DESMOPRESSIN ACETATE 40 MG: 0.2 TABLET ORAL at 08:38

## 2020-06-11 RX ADMIN — TAMSULOSIN HYDROCHLORIDE 0.4 MG: 0.4 CAPSULE ORAL at 08:38

## 2020-06-11 RX ADMIN — SODIUM CHLORIDE, POTASSIUM CHLORIDE, SODIUM LACTATE AND CALCIUM CHLORIDE: 600; 310; 30; 20 INJECTION, SOLUTION INTRAVENOUS at 23:30

## 2020-06-11 RX ADMIN — SODIUM CHLORIDE: 9 INJECTION, SOLUTION INTRAVENOUS at 03:38

## 2020-06-11 RX ADMIN — SODIUM CHLORIDE, POTASSIUM CHLORIDE, SODIUM LACTATE AND CALCIUM CHLORIDE: 600; 310; 30; 20 INJECTION, SOLUTION INTRAVENOUS at 09:47

## 2020-06-11 RX ADMIN — WARFARIN SODIUM 3 MG: 2 TABLET ORAL at 17:03

## 2020-06-11 RX ADMIN — INSULIN LISPRO 1 UNITS: 100 INJECTION, SOLUTION INTRAVENOUS; SUBCUTANEOUS at 16:55

## 2020-06-11 ASSESSMENT — PAIN SCALES - GENERAL
PAINLEVEL_OUTOF10: 0

## 2020-06-11 NOTE — PLAN OF CARE
Problem: Falls - Risk of:  Goal: Will remain free from falls  Description: Will remain free from falls  Outcome: Ongoing  Note: Patient free from harm. ID bands on, bed in lowest position, call light in reach. Patient instructed to call for help if needed. Patient educated on ambulation and safety.     Goal: Absence of physical injury  Description: Absence of physical injury  Outcome: Ongoing

## 2020-06-11 NOTE — PLAN OF CARE
Problem: Falls - Risk of:  Goal: Will remain free from falls  Description: Will remain free from falls  6/11/2020 1017 by Yeny Rocha RN  Outcome: Ongoing     Problem: Falls - Risk of:  Goal: Absence of physical injury  Description: Absence of physical injury  6/11/2020 1017 by Yeny Rocha RN  Outcome: Ongoing   Patient is in bed resting quietly, HOB elevated. Call light within reach. Refused breakfast, drank and tolerated PO fluids well. VSS. WCTM.

## 2020-06-11 NOTE — PROGRESS NOTES
Hospitalist Progress Note    CC: Acute kidney injury superimposed on chronic kidney disease ([address]      Admit date: 6/9/2020  Days in hospital:  2    Subjective/interval history: Pt S/E. Pt awake today, still confused. He is worried about his car and money. Didn't eat breakfast, but drank apple juice. ROS:   Pertinent items are noted in HPI. Objective:    /74   Pulse 65   Temp 98.2 °F (36.8 °C) (Axillary)   Resp 18   Ht 5' 7\" (1.702 m)   Wt 168 lb 8 oz (76.4 kg)   SpO2 99%   BMI 26.39 kg/m²     Gen:  NAD  HEENT: NC/AT, moist mucous membranes  Neck: supple, trachea midline  Heart: Normal s1/s2, RRR, no murmurs, gallops, or rubs. Lungs: clear bilaterally, no wheezing, no rales, no rhonchi, no use of accessory muscles  Abd: bowel sounds present, soft, nontender, nondistended, no masses  Extrem: No clubbing, cyanosis, no edema  Skin: no rashes or lesions  Psych: Alert, oriented to name, place, year only. Neuro: grossly intact, moves all four extremities spontaneously.  No focal deficits  Cap refill: +2 sec    Medications:  Scheduled Meds:   warfarin  3 mg Oral Daily    insulin glargine  5 Units Subcutaneous Nightly    atorvastatin  40 mg Oral Daily    atenolol  25 mg Oral Daily    pantoprazole  40 mg Oral QAM AC    tamsulosin  0.4 mg Oral Daily    insulin lispro  0-6 Units Subcutaneous TID WC    insulin lispro  0-3 Units Subcutaneous Nightly       PRN Meds:  sennosides-docusate sodium, glucose, dextrose, glucagon (rDNA), dextrose, acetaminophen **OR** acetaminophen, ondansetron **OR** ondansetron    IV:   dextrose      sodium chloride 75 mL/hr at 06/11/20 0338         Intake/Output Summary (Last 24 hours) at 6/11/2020 0919  Last data filed at 6/11/2020 0845  Gross per 24 hour   Intake 2295 ml   Output 1475 ml   Net 820 ml       Results:  CBC:   Recent Labs     06/09/20  1359 06/10/20  0452   WBC 12.1* 10.4   HGB 13.1* 11.9*   HCT 40.0* 35.9*   MCV 93.7 93.5    Nephrology was consulted and hospitalist team contacted to admit patient for close observation and treatment of his ROSE. per son, over the past few months he has been having visual hallucinations and worsening confusion. He has been lethargic, not speaking or moving much since discharge a week ago. He walked with a walker or cane by himself before this week.       Plan:  ROSE on ckd III  - crt baseline 1-1.2, 5.4 on admission -> 4.8 -> 3.5  - due to obstructive nephropathy   - IVF  - Avoid nephrotoxins  - nephrology consulted    Urinary retention   - due to bph  - jimenez placed  - cont flomax   - hold seroquel for now  - urology consulted    Acute metabolic encephalopathy - improving  - due to uremia  - blood cultures ngtd  - head CT negative  - check TSH - .68  - treat associated conditions  - avoid sedating meds as able  - supportive care    Diabetes  - stable  - hold home PO meds  - reduce lantus, hold prandial as he is not eating, adjust when he begins to eat  - SSI, accuchecks    paf   - currently rate controlled  - continue bb   - On Coumadin; monitor daily PT/INR, today 2.5    Dementia  - pt is at very high risk for delirium, will minimize sedating meds, remove jimenez as soon as able, avoid constipation, control pain, frequent reorientation   - PT/OT    Code status: limited, dnr/dni  DVT prophylaxis: [] Lovenox  [] SQ Heparin  [] SCDs because of  [x] warfarin/oral direct thrombin inhibitor [] Encourage ambulation      Disposition:  [] Home [] Rehab [] Psych [] SNF  [] LTAC  [] Transfer to ICU  [] Transfer to PCU [] Other: in pt      Electronically signed by Arnaldo Waldron DO on 6/11/2020 at 9:19 AM

## 2020-06-11 NOTE — CARE COORDINATION
Discharge Planning Assessment    RN/SW discharge planner met with patient/ (and family member) to discuss reason for admission, current living situation, and potential needs at the time of discharge    Demographics/Insurance verified Yes    Current type of dwelling: garage entrance up 2 steps one floor plan    Patient from ECF/SW confirmed with: [address] [place] [address] psychiatric care for dementia. [facility] from [facility], [phone] (option1) with a [address] faxed to [phone], but then  released home with family after 4 days. Patient was only home less than a day then admitted here for ROSE/urine retention. The wife was told he has Parkinsons in the past, but PCP disagreed. Living arrangements: Spouse/ son [place] [phone]    Level of function/Support:   did poorly once recent discharge, in worse condition than he was when he left [facility], according to the wife.     PCP: Ban Andres MD    DME: meng mccallum, (24years old)    Active with any community resources/agencies/skilled home care: no    Medication compliance issues: no    Financial issues that could impact healthcare: no    Transportation at the time of discharge: wife    The wife has no SNF preference but will rely on their son to make decision,  Andrew Jama, [phone] after 2pm.  Many facilities will require a negative CoVid test.

## 2020-06-11 NOTE — CONSULTS
Urology Consult Note  [facility]     Patient: James Bhagat MRN: [id number]  Room/Bed: [address] [place] [address]91   YOB: 1935  Age/Sex: 82 y.o.male  Admission Date: 6/9/2020     Date of Service:  6/11/2020    Consulting Physician: Alecia Mckeon  Admitting/Requesting Physician: Marycruz Merlos MD  Primary Care Physician: Ginny Jacob MD    Reason for Consult: Urinary retention    ASSESSMENT/PLAN     Acute urinary retnetion  - Catheter placed with > 1 L urine  - no prior urologic history per chart review  - remote PSA 1-2 range  - UA neg nit/LE  MMP with Afib on coumadin, ckd III, dm, admitted with ROSE, AMS since discharge from a geriatric psychiatric facility    Recommendations:  Cont jimenez x at least 1 week as Cr continues to improve  Flomax 0.4 mg daily  Will need close monitoring of bladder residuals after voiding trial; high risk of failure  Outpatient urology followup requested    HISTORY     Chief Complaint:   Chief Complaint   Patient presents with    Fatigue     Patient arrives via medic with c.c of lethargy. Patient was admitted to a psychiatric facility in [place] per patients family he has not acted right since he has been home. Per medics family did not state any new medication use that they are aware of . Patient is alert to name. Patient is disoriented to time, place, and situation. Resp e.u and skin warm and dry. History of Present Illness: James Bhagat is a 82 y.o. male with AUR. Onset of symptoms was acute with stable course since that time. Symptoms are aggravated by cath. Symptoms improved with cath. Associated symptoms include penlie pain. Patient also reports no other  sx - poor historian does not answer questions well, dementia AMS.     . He has tried the following treatments: cath, flomax    Past Medical History:  He has a past medical history of A-fib ([address] Arthritis, CKD (chronic kidney disease) stage 2, GFR 60-89 ml/min (12/22/2018), Diabetes Negative   Gastrointestinal: Negative   Musculoskeletal: Negative   Neurological: Negative   Psychiatric: Negative   Integumentary: Negative     PHYSICAL EXAM     Vitals:    06/11/20 0730   BP: 134/74   Pulse: 65   Resp: 18   Temp: 98.2 °F (36.8 °C)   SpO2: 99%     CONSTITUTIONAL: The patient is elderly, frail with no distress noted. NEUROLOGICAL/PSYCHIATRIC: Not oriented to place and time, normal affected noted. NECK: The neck is symmetrical and supple, with no masses noted. CARDIOVASCULAR: Regular rate and rhythm, no evidence of swelling noted. RESPIRATORY: Normal respiratory effort with no wheezing noted. ABDOMEN: Abdomen soft, non-tender, non-distended. No enlarged liver or spleen. No hernias noted. Stool occult blood not indicated. SKIN: Skin appears normal.  LYMPHATICS: No adenopathy noted. CVA: No CVA tenderness bilaterally. GENITOURINARY: The penis is without rash or lesions and meatus with expected size and location. The scrotum appears normal. Bilateral testicles appears to be of normal size and location. No masses or tenderness noted of testicles or epididymis. Rosas cyu  AMY: Deferred.     Ins/Outs:    Intake/Output Summary (Last 24 hours) at 6/11/2020 1047  Last data filed at 6/11/2020 0845  Gross per 24 hour   Intake 2295 ml   Output 1475 ml   Net 820 ml       LABS     CBC   Lab Results   Component Value Date    WBC 10.4 06/10/2020    RBC 3.84 06/10/2020    HGB 11.9 06/10/2020    HCT 35.9 06/10/2020    MCV 93.5 06/10/2020    MCH 30.9 06/10/2020    MCHC 33.0 06/10/2020    RDW 15.4 06/10/2020     06/10/2020    MPV 10.7 06/10/2020     BMP   Lab Results   Component Value Date     06/11/2020    K 3.6 06/11/2020    K 3.8 06/10/2020     06/11/2020    CO2 18 06/11/2020    BUN 75 06/11/2020    CREATININE 3.5 06/11/2020    GLUCOSE 111 06/11/2020    CALCIUM 8.1 06/11/2020     Urinalysis:   Lab Results   Component Value Date    COLORU DK YELLOW 06/09/2020    GLUCOSEU Negative

## 2020-06-11 NOTE — PROGRESS NOTES
Occupational Therapy  Facility/Department: [address]  Daily Treatment Note  NAME: Eugenia Daniels  : 1935  MRN: [id number]    Date of Service: 2020    Discharge Recommendations: Eugenia Daniels scored a 13/24 on the AM-PAC ADL Inpatient form. Current research shows that an AM-PAC score of 17 or less is typically not associated with a discharge to the patient's home setting. Based on the patient's AM-PAC score and their current ADL deficits, it is recommended that the patient have 3-5 sessions per week of Occupational Therapy at d/c to increase the patient's independence. At this time, this patient lacks the endurance, and/or tolerance for 3 hours of therapy/day, 5-7x/wk and would benefit most from a follow up treatment frequency of 3-5x/wk. Please see assessment section for further patient specific details. If patient discharges prior to next session this note will serve as a discharge summary. Please see below for the latest assessment towards goals. OT Equipment Recommendations  Equipment Needed: No  Other: defer to next level    Assessment   Performance deficits / Impairments: Decreased functional mobility ; Decreased ADL status; Decreased cognition;Decreased safe awareness;Decreased endurance;Decreased balance  Assessment: Patient presents with the above deficits, which are below baseline, and would benefit from continued skilled OT to address. Pt has demonstrated progress toward functional mobility and ADL goals this date requiring Melissa for UB ADLs and ModA + CGA for short distance mobility.    Treatment Diagnosis: Decreased ADLs, IADLs, transfers, mobility associated with ROSE  Prognosis: Fair  OT Education: OT Role;Plan of Care;Transfer Training  Patient Education: pt required continued education  REQUIRES OT FOLLOW UP: Yes  Activity Tolerance  Activity Tolerance: Treatment limited secondary to decreased cognition;Patient limited by fatigue  Activity Tolerance: frequent washing seated EOB)  UE Bathing: Minimal assistance;Verbal cueing; Increased time to complete(assist to wash L UE secondary to limted R ROM at elbow d/t IV line placement)  LE Bathing: Setup; Increased time to complete;Verbal cueing(pt washed LEs only, pt declined evita hygiene and washing feet. CGA EOB for pt to bend forward to reach LEs)  UE Dressing: Minimal assistance;Setup;Verbal cueing(Joaquin secondary to line management; doff/don hospital gown)  Toileting: (jimenez cath present)  Additional Comments: Pt completed UB dressing and UB/LB bathing (warm wipes) seated EOB. cues for thoroughness and attention to task. Balance  Sitting Balance: Moderate assistance(ModA progressing to CGA)  Standing Balance: Maximum assistance(Max progressing to Joaquin)  Standing Balance  Time: ~8min total  Activity: functional mobility/transfers  Comment: upon inital stance from EOB pt with significant posterior lean- when asked to march LEs pt able to correct posterior lean. during second stance posterior lean noted- Joaquin to correct with cues to hips  Functional Mobility  Functional - Mobility Device: Rolling Walker  Activity: Other  Assist Level: Maximum assistance  Functional Mobility Comments: MaxA progressing to [facility] [place] [facility] of 1 with follow for safety. ~20ft total  Bed mobility  Rolling to Left: Moderate assistance  Rolling to Right: Moderate assistance  Supine to Sit: Maximum assistance  Scooting: Maximal assistance  Comment: cues to sequence, increased time to complete  Transfers  Sit to stand: 2 Person assistance  Stand to sit: 2 Person assistance  Transfer Comments: MaxA of 2 progressing to MaxA of 1, to/from EOB x2 and RW>recliner                       Cognition  Overall Cognitive Status: Exceptions  Arousal/Alertness: Delayed responses to stimuli  Following Commands: Follows one step commands with increased time; Follows one step commands with repetition  Attention Span: Attends with cues to redirect; Difficulty dividing

## 2020-06-11 NOTE — PROGRESS NOTES
Office : [phone]     Fax :[phone]       Nephrology  Note        Chief Complaint:    Chief Complaint   Patient presents with    Fatigue     Patient arrives via medic with c.c of lethargy. Patient was admitted to a psychiatric facility in [place] per patients family he has not acted right since he has been home. Per medics family did not state any new medication use that they are aware of . Patient is alert to name. Patient is disoriented to time, place, and situation. Resp e.u and skin warm and dry. History of Present Ilness:    Nayan Robertson is a 82 y.o. male with h/o atrial fib, DM2 , HTN, OA, was brought to the ED for evaluation of increased lethargy, confusion and altered mental status since his discharge from a geriatric psychiatric facility     His son reports that he has recently been diagnosed with dementia and that he was not initiated on any new medications following discharge from his psychiatric stay. Labs were obtained and notable for a severe acute kidney injury with a creatinine of 5.4 and high anion gap metabolic acidosis. He was noted to have urinary retention. Jimenez catheter was placed in ED and resulted in drainage of 1.5 L urine. He is awake but confused.    Vitals stable     Interval HX     Seen and examined  Awake  UOP better   Has jimenez   Creatinine slightly improved   No acute events overnight       Past Medical History:   Diagnosis Date    A-fib ([facility]     Arthritis     CKD (chronic kidney disease) stage 2, GFR 60-89 ml/min 12/22/2018    Diabetes mellitus ([facility]     GERD (gastroesophageal reflux disease) 12/22/2018    High blood pressure     Hyperlipidemia     Osteoarthritis     Type II or unspecified type diabetes mellitus

## 2020-06-11 NOTE — PROGRESS NOTES
Physical Therapy  Facility/Department: [address]  Daily Treatment Note  NAME: Nayan Robertson  : 1935  MRN: [id number]    Date of Service: 2020    Discharge Recommendations:    Nayan Robertson scored a  on the AM-PAC short mobility form. Current research shows that an AM-PAC score of 17 or less is typically not associated with a discharge to the patient's home setting. Based on the patient's AM-PAC score and their current functional mobility deficits, it is recommended that the patient have 3-5 sessions per week of Physical Therapy at d/c to increase the patient's independence. At this time, this patient lacks the endurance, and/or tolerance for 3 hours of therapy/day, 5-7x/wk and would benefit most from a follow up treatment frequency of 3-5x/wk. Please see assessment section for further patient specific details. If patient discharges prior to next session this note will serve as a discharge summary. Please see below for the latest assessment towards goals. PT Equipment Recommendations  Equipment Needed: No    Assessment   Body structures, Functions, Activity limitations: Decreased functional mobility ; Decreased ROM; Decreased strength;Decreased safe awareness;Decreased cognition;Decreased endurance;Decreased balance;Decreased posture  Assessment: Pt presenting with significant improvement in functional mobility including improved bed mobility, transfers, and progression to out of bed activity. Pt would benefit from continued therapy services to regain baseline functional mobility status. Treatment Diagnosis: impaired functional mobility  Prognosis: Fair  PT Education: Goals;PT Role;Plan of Care; Functional Mobility Training;Gait Training;General Safety  Patient Education: d/c recommendations-- pt will require reinforcement due to cognitive deficits.   Barriers to Learning: cognition  REQUIRES PT FOLLOW UP: Yes  Activity Tolerance  Activity Tolerance: Patient Tolerated

## 2020-06-12 LAB
ANION GAP SERPL CALCULATED.3IONS-SCNC: 10 MMOL/L (ref 3–16)
BUN BLDV-MCNC: 56 MG/DL (ref 7–20)
CALCIUM SERPL-MCNC: 8.2 MG/DL (ref 8.3–10.6)
CHLORIDE BLD-SCNC: 115 MMOL/L (ref 99–110)
CO2: 20 MMOL/L (ref 21–32)
CREAT SERPL-MCNC: 2.3 MG/DL (ref 0.8–1.3)
GFR AFRICAN AMERICAN: 33
GFR NON-AFRICAN AMERICAN: 27
GLUCOSE BLD-MCNC: 110 MG/DL (ref 70–99)
GLUCOSE BLD-MCNC: 117 MG/DL (ref 70–99)
GLUCOSE BLD-MCNC: 134 MG/DL (ref 70–99)
GLUCOSE BLD-MCNC: 154 MG/DL (ref 70–99)
GLUCOSE BLD-MCNC: 202 MG/DL (ref 70–99)
INR BLD: 2.8 (ref 0.86–1.14)
MAGNESIUM: 1.5 MG/DL (ref 1.8–2.4)
PERFORMED ON: ABNORMAL
POTASSIUM SERPL-SCNC: 3.1 MMOL/L (ref 3.5–5.1)
PROTHROMBIN TIME: 32.8 SEC (ref 10–13.2)
SODIUM BLD-SCNC: 145 MMOL/L (ref 136–145)

## 2020-06-12 PROCEDURE — 6370000000 HC RX 637 (ALT 250 FOR IP): Performed by: FAMILY MEDICINE

## 2020-06-12 PROCEDURE — 6370000000 HC RX 637 (ALT 250 FOR IP): Performed by: HOSPITALIST

## 2020-06-12 PROCEDURE — 97530 THERAPEUTIC ACTIVITIES: CPT

## 2020-06-12 PROCEDURE — 2060000000 HC ICU INTERMEDIATE R&B

## 2020-06-12 PROCEDURE — 2580000003 HC RX 258: Performed by: INTERNAL MEDICINE

## 2020-06-12 PROCEDURE — 97535 SELF CARE MNGMENT TRAINING: CPT

## 2020-06-12 PROCEDURE — 85610 PROTHROMBIN TIME: CPT

## 2020-06-12 PROCEDURE — 83735 ASSAY OF MAGNESIUM: CPT

## 2020-06-12 PROCEDURE — 80048 BASIC METABOLIC PNL TOTAL CA: CPT

## 2020-06-12 PROCEDURE — 2580000003 HC RX 258: Performed by: HOSPITALIST

## 2020-06-12 PROCEDURE — 36415 COLL VENOUS BLD VENIPUNCTURE: CPT

## 2020-06-12 PROCEDURE — 6360000002 HC RX W HCPCS: Performed by: FAMILY MEDICINE

## 2020-06-12 RX ORDER — WARFARIN SODIUM 1 MG/1
1.5 TABLET ORAL DAILY
Status: DISCONTINUED | OUTPATIENT
Start: 2020-06-12 | End: 2020-06-13

## 2020-06-12 RX ORDER — MAGNESIUM SULFATE IN WATER 40 MG/ML
4 INJECTION, SOLUTION INTRAVENOUS ONCE
Status: COMPLETED | OUTPATIENT
Start: 2020-06-12 | End: 2020-06-12

## 2020-06-12 RX ADMIN — TAMSULOSIN HYDROCHLORIDE 0.4 MG: 0.4 CAPSULE ORAL at 08:17

## 2020-06-12 RX ADMIN — INSULIN LISPRO 1 UNITS: 100 INJECTION, SOLUTION INTRAVENOUS; SUBCUTANEOUS at 11:41

## 2020-06-12 RX ADMIN — DESMOPRESSIN ACETATE 40 MG: 0.2 TABLET ORAL at 08:17

## 2020-06-12 RX ADMIN — POTASSIUM BICARBONATE 40 MEQ: 782 TABLET, EFFERVESCENT ORAL at 10:22

## 2020-06-12 RX ADMIN — SODIUM CHLORIDE, POTASSIUM CHLORIDE, SODIUM LACTATE AND CALCIUM CHLORIDE: 600; 310; 30; 20 INJECTION, SOLUTION INTRAVENOUS at 23:26

## 2020-06-12 RX ADMIN — ATENOLOL 25 MG: 25 TABLET ORAL at 08:17

## 2020-06-12 RX ADMIN — PANTOPRAZOLE SODIUM 40 MG: 40 TABLET, DELAYED RELEASE ORAL at 08:17

## 2020-06-12 RX ADMIN — INSULIN GLARGINE 5 UNITS: 100 INJECTION, SOLUTION SUBCUTANEOUS at 23:26

## 2020-06-12 RX ADMIN — INSULIN LISPRO 1 UNITS: 100 INJECTION, SOLUTION INTRAVENOUS; SUBCUTANEOUS at 23:26

## 2020-06-12 RX ADMIN — MAGNESIUM SULFATE IN WATER 4 G: 40 INJECTION, SOLUTION INTRAVENOUS at 10:39

## 2020-06-12 RX ADMIN — WARFARIN SODIUM 1.5 MG: 1 TABLET ORAL at 17:22

## 2020-06-12 ASSESSMENT — PAIN SCALES - GENERAL
PAINLEVEL_OUTOF10: 0

## 2020-06-12 NOTE — PROGRESS NOTES
assistance  Supine to Sit: Dependent/Total;2 Person assistance(max A of 2)  Scooting: Dependent/Total  Transfers  Sit to Stand: Dependent/Total;2 Person Assistance(mod A of 2 from bed and chair; cues for hand placement)  Stand to sit: 2 Person Assistance;Dependent/Total(mod A of 2; cues for hand placement)  Ambulation  Ambulation?: Yes  Ambulation 1  Surface: level tile  Device: Rolling Walker  Assistance: 2 Person assistance;Dependent/Total(mod A of 2 progressing to min A of 2; assist to maneuver RW)  Quality of Gait: forward flexed posture, decreased chetan, reduced stride length BLEs  Distance: 32 ft  Stairs/Curb  Stairs?: No     Balance  Posture: Fair  Sitting - Static: Fair;+(CGA-SBA on EOB)  Sitting - Dynamic: Fair(CGA for combing hair EOB)  Standing - Static: Poor  Standing - Dynamic: Poor  Comments: Pt stands with posterior leaning, tactile and verbal cues for upright posture; mod A of 2 with initial standing, progressing to min A of 2 at times         AM-PAC Score  AM-PAC Inpatient Mobility Raw Score : 8 (06/12/20 1419)  AM-PAC Inpatient T-Scale Score : 28.52 (06/12/20 1419)  Mobility Inpatient CMS 0-100% Score: 86.62 (06/12/20 1419)  Mobility Inpatient CMS G-Code Modifier : CM (06/12/20 1419)          Goals  Short term goals  Time Frame for Short term goals: upon d/c  Short term goal 1: Pt will perform bed mobility with mod Ax1  Short term goal 2: Pt will perform transfers with LRAD and mod Ax1  Short term goal 3: Pt will ambulate 8' with LRAD and mod Ax1  Patient Goals   Patient goals : pt unable to state  No goals met    Plan    Plan  Times per week: 3-5x  Times per day: Daily  Current Treatment Recommendations: Strengthening, Transfer Training, Endurance Training, Neuromuscular Re-education, Balance Training, Functional Mobility Training, Gait Training, Safety Education & Training, Equipment Evaluation, Education, & procurement, Patient/Caregiver Education & Training, ADL/Self-care Training  Safety

## 2020-06-12 NOTE — PROGRESS NOTES
Shift assessment completed, see doc flowsheets. Pt currently resting quietly in bed, has no complaints at this time. Pt is withdrawn and has a flat affect, but is answering questions when asked, currently only alert to self. Pt turned and repositioned. Call light within reach, will continue to monitor.   Yousif Hamden

## 2020-06-12 NOTE — PROGRESS NOTES
.  Safety Devices  Safety Devices in place: Yes  Type of devices: Left in chair;Call light within reach; Chair alarm in place;Nurse notified; All fall risk precautions in place         Patient Diagnosis(es): The primary encounter diagnosis was ROSE (acute kidney injury) ([facility] Diagnoses of Retention of urine and Altered mental status, unspecified altered mental status type were also pertinent to this visit. has a past medical history of A-fib ([facility] Arthritis, CKD (chronic kidney disease) stage 2, GFR 60-89 ml/min, Diabetes mellitus ([facility] GERD (gastroesophageal reflux disease), High blood pressure, Hyperlipidemia, Osteoarthritis, Type II or unspecified type diabetes mellitus without mention of complication, not stated as uncontrolled, and Vitamin D deficiency. has a past surgical history that includes Knee arthroscopy (2000); Knee arthroscopy (2001); Elbow surgery (1988); back surgery (1979); and back surgery. Restrictions  Restrictions/Precautions  Restrictions/Precautions: Fall Risk, Modified Diet(renal diet, dysphagia soft and bite sized)  Required Braces or Orthoses?: No  Position Activity Restriction  Other position/activity restrictions: Gianna Ramirez is a 82 y.o. male who presents for evaluation of lethargy, increased confusion and altered mental status. Family reports the patient was recently discharged from an adult geriatric psych facility a couple of days ago and has not been acting right since his been home. They deny any known new medications. Subjective   General  Chart Reviewed: Yes  Patient assessed for rehabilitation services?: Yes  Response to previous treatment: Patient with no complaints from previous session  Family / Caregiver Present: No  Diagnosis: ROSE  Subjective  Subjective: Pt supine in bed at entry, agreeable to therapy session.   Pre Treatment Pain Screening  Intervention List: Patient able to continue with treatment  Vital Signs  Patient Currently in Pain: Denies

## 2020-06-12 NOTE — PROGRESS NOTES
06/10/20  0452   WBC 12.1* 10.4   HGB 13.1* 11.9*    181     BMP:    Recent Labs     06/10/20  0452 06/11/20  0455 06/12/20  0507    145 145   K 3.8 3.6 3.1*   * 115* 115*   CO2 19* 18* 20*   BUN 90* 75* 56*   CREATININE 4.8* 3.5* 2.3*   GLUCOSE 123* 111* 110*     Ca/Mg/Phos:   Recent Labs     06/10/20  0452 06/11/20  0455 06/12/20  0506 06/12/20  0507   CALCIUM 8.1* 8.1*  --  8.2*   MG  --   --  1.50*  --      Hepatic:   Recent Labs     06/09/20  1359   AST 43*   ALT 23   BILITOT 0.3   ALKPHOS 93     Troponin:   Recent Labs     06/09/20  1359   TROPONINI 0.03*     BNP: No results for input(s): BNP in the last 72 hours. Lipids: No results for input(s): CHOL, TRIG, HDL, LDLCALC, LABVLDL in the last 72 hours. ABGs: No results for input(s): PHART, PO2ART, ZGX2OOH in the last 72 hours. INR:   Recent Labs     06/10/20  0453 06/11/20  0455 06/12/20  0507   INR 3.73* 2.52* 2.80*     UA:  Recent Labs     06/09/20  1359   COLORU DK YELLOW   CLARITYU Clear   GLUCOSEU Negative   BILIRUBINUR Negative   KETUA Negative   SPECGRAV 1.014   BLOODU SMALL*   PHUR 5.5   PROTEINU 100*   UROBILINOGEN 0.2   NITRU Negative   LEUKOCYTESUR Negative   LABMICR YES   URINETYPE Voided      Urine Microscopic:   Recent Labs     06/09/20  1359   HYALCAST 0   WBCUA 6*   RBCUA 9*   EPIU 0     Urine Culture: No results for input(s): LABURIN in the last 72 hours. Urine Chemistry: No results for input(s): Glendell Lobo, PROTEINUR, NAUR in the last 72 hours. IMAGING:  XR CHEST PORTABLE   Final Result   Stable moderate cardiomegaly. CT ABDOMEN PELVIS WO CONTRAST   Final Result   Subtle injection of the fat surrounds the bladder. Recommend correlation   with urinalysis to exclude cystitis. There is prostatomegaly      Punctate calcifications in the right and left kidney, either vascular or   nonobstructing renal stones. No hydronephrosis      Cholelithiasis               Assessment/Plan :      1. ROSE .  2/2 Urinary obstruction   Creatinine is downtrending   Has BPH and on flomax    Recommend to dose adjust all medications  based on renal functions  Maintain SBP> 90 mmHg   Daily weights   AVOID NSAIDs  Avoid Nephrotoxins  Monitor Intake/Output      2. HTN. BP fairly controlled     3. metabolic acidosis 2/2 ROSE. Will change iv fluids to LR now     5. Hypokalemia - will supplement iv     6.  Hypomagnesemia - supplement iv       Will continue to monitor                 Thank you for allowing us to participate in care of Cornelio Brown         Electronically signed by: Tameka Hunt MD, 6/12/2020, 10:08 AM      [facility] [address]  Office : [phone]  Fax :[phone]

## 2020-06-12 NOTE — CARE COORDINATION
SonAgapito is agreeable for patient to discharge to the [place], [phone], fax [phone] when medically ready, pending Valentine CLAYTON/MERLENE Precert. Esdras Quinonez will submit for precert now. HENS submitted and placed in ECF envelope.   MD, Please complete & sign the e-CHAVA under the discharge instructions, AVS/Prescriptions, and or medical necessity note for assistance with discharge planning, Thank you, Cristina Goodman, MSW, [phone]

## 2020-06-12 NOTE — PROGRESS NOTES
40.0* 35.9*   MCV 93.7 93.5    181     BMP:   Recent Labs     06/10/20  0452 06/11/20  0455 06/12/20  0507    145 145   K 3.8 3.6 3.1*   * 115* 115*   CO2 19* 18* 20*   BUN 90* 75* 56*   CREATININE 4.8* 3.5* 2.3*     Mag: No results for input(s): MAG in the last 72 hours. Phos: No results found for: PHOS  No components found for: GLU    LIVER PROFILE:   Recent Labs     06/09/20  1359   AST 43*   ALT 23   LIPASE 71.0*   BILITOT 0.3   ALKPHOS 93     PT/INR:   Recent Labs     06/10/20  0453 06/11/20  0455 06/12/20  0507   PROTIME 43.8* 29.5* 32.8*   INR 3.73* 2.52* 2.80*     APTT:   Recent Labs     06/09/20  1359   APTT 37.7*     UA:  Recent Labs     06/09/20  1359   COLORU DK YELLOW   PHUR 5.5   WBCUA 6*   RBCUA 9*   CLARITYU Clear   SPECGRAV 1.014   LEUKOCYTESUR Negative   UROBILINOGEN 0.2   BILIRUBINUR Negative   BLOODU SMALL*   GLUCOSEU Negative       Invalid input(s): ABG  Lab Results   Component Value Date    CALCIUM 8.2 (L) 06/12/2020       Assessment:    Principal Problem:    Acute kidney injury superimposed on chronic kidney disease ([facility]  Active Problems:    Diabetes mellitus ([facility] [address] [facility]    Hypertension    Acute retention of urine    Altered mental status  Resolved Problems:    * No resolved hospital problems. [facility] course: an 83 yo male with paf on coumadin, ckd III, dm, admitted with confusion and altered mental status since discharge from a geriatric psychiatric facility several days prior to admission. His son reports that he has recently been diagnosed with dementia and that he was not initiated on any new medications following discharge from his psychiatric stay. Labs were obtained and notable for a severe acute kidney injury with a creatinine of 5.4 and high anion gap metabolic acidosis. Upon further examination, the patient was noted to have a large distended palpable bladder extending into his upper abdominal field.   Rosas catheter was placed in ED and resulted in drainage of 1.5 L of rust colored urine. Nephrology was consulted and hospitalist team contacted to admit patient for close observation and treatment of his ROSE. per son, over the past few months he has been having visual hallucinations and worsening confusion. He has been lethargic, not speaking or moving much since discharge a week ago. He walked with a walker or cane by himself before this week. Plan:  ROSE on ckd III - improving  - crt baseline 1-1.2, 5.4 on admission -> 4.8 -> 2.3  - due to obstructive nephropathy   - IVF  - Avoid nephrotoxins  - nephrology consulted    Urinary retention   - due to bph  - jimenez placed, will need for at least one week with a VT after discharge.  He will follow up with urology in the out pt setting  - cont flomax   - hold seroquel for now  - urology consulted    Acute metabolic encephalopathy - improving  - due to uremia  - blood cultures ngtd  - head CT negative  - TSH - .68  - treat associated conditions  - avoid sedating meds as able  - supportive care    Diabetes  - stable  - hold home PO meds  - reduce lantus, hold prandial as he is not eating much  - SSI, accuchecks    paf   - currently rate controlled  - continue bb   - On Coumadin; monitor daily PT/INR, today 2.8    Dementia  - pt is at very high risk for delirium, will minimize sedating meds, remove jimenez as soon as able, avoid constipation, control pain, frequent reorientation   - PT/OT    Code status: limited, dnr/dni  DVT prophylaxis: [] Lovenox  [] SQ Heparin  [] SCDs because of  [x] warfarin/oral direct thrombin inhibitor [] Encourage ambulation      Disposition:  [] Home [] Rehab [] Psych [] SNF  [] LTAC  [] Transfer to ICU  [] Transfer to PCU [] Other: in pt      Electronically signed by Tracy Santiago DO on 6/12/2020 at 9:19 AM

## 2020-06-12 NOTE — ADT AUTH CERT
Renal Failure, Acute - Care Day 3 (6/11/2020) by Mauricio Rushing RN         Review Status Review Entered   Completed 6/12/2020 10:04       Criteria Review      Care Day: 3 Care Date: 6/11/2020 Level of Care:    Guideline Day 3    Level Of Care    (X) Floor    Clinical Status    (X) * Mental status at baseline or improved    6/12/2020 10:04 AM EDT by Ana Kasper      confused    (X) * Respiratory status at baseline or improved    Activity    (X) Activity as tolerated    Routes    (X) * Oral hydration, medications, and diet    Interventions    (X) Monitor electrolytes, renal function tests, acid-base, and volume status    Medications    (X) Possible medical therapies    * Milestone   Additional Notes   6/11      Vitals:  /67   Pulse 77   Temp 97.6 °F (36.4 °C) (Axillary)   Resp 16   Ht 5' 7\" (1.702 m)   Wt 170 lb 3.2 oz (77.2 kg)   SpO2 98%   BMI 26.66 kg/m²         Awake but not oriented       Labs      Sodium: 145   Potassium: 3.6   Chloride: 115 (H)   CO2: 18 (L)   BUN: 75 (H)   Creatinine: 3.5 (H)   Anion Gap: 12   GFR Non-: 17 (A)   GFR : 20 (A)   Glucose: 111 (H)   Calcium: 8.1 (L)   Prothrombin Time: 29.5 (H)   INR: 2.52 (H)      Orders   IV LR 75 ml/h      Per Nephrology   Interval HX        Seen and examined   Awake   UOP better    Has jimenez    Creatinine slightly improved    No acute events overnight       Assessment/Plan :           1. ROSE . 2/2  Urinary obstruction    Creatinine is downtrending            Has BPH and on flomax       Recommend to dose adjust all medications  based on renal functions   Maintain SBP> 90 mmHg    Daily weights    AVOID NSAIDs   Avoid Nephrotoxins   Monitor Intake/Output           2. HTN. BP fairly controlled        3. metabolic acidosis 2/2 ROSE. Will change iv fluids to LR now        5. Electrolytes.  Monitor and replace as needed     Will continue to monitor        Per Urology      ASSESSMENT/PLAN       Acute urinary

## 2020-06-12 NOTE — PLAN OF CARE
Problem: Falls - Risk of:  Goal: Will remain free from falls  Description: Will remain free from falls  6/12/2020 1221 by Sade Stewart RN  Outcome: Ongoing     Problem: Falls - Risk of:  Goal: Absence of physical injury  Description: Absence of physical injury  6/12/2020 1221 by Sade Stewart RN  Outcome: Ongoing   Patient pleasantly confused, in bed at this time, HOB elevated, call light within reach. VSS. Rosas cath draining clear yellow urine with few clots. Poor appetite, enjoys vanilla pudding and apple juice. WCTM.

## 2020-06-13 LAB
ANION GAP SERPL CALCULATED.3IONS-SCNC: 10 MMOL/L (ref 3–16)
BLOOD CULTURE, ROUTINE: NORMAL
BUN BLDV-MCNC: 37 MG/DL (ref 7–20)
CALCIUM SERPL-MCNC: 8.5 MG/DL (ref 8.3–10.6)
CHLORIDE BLD-SCNC: 111 MMOL/L (ref 99–110)
CO2: 23 MMOL/L (ref 21–32)
CREAT SERPL-MCNC: 1.8 MG/DL (ref 0.8–1.3)
GFR AFRICAN AMERICAN: 44
GFR NON-AFRICAN AMERICAN: 36
GLUCOSE BLD-MCNC: 126 MG/DL (ref 70–99)
GLUCOSE BLD-MCNC: 128 MG/DL (ref 70–99)
GLUCOSE BLD-MCNC: 145 MG/DL (ref 70–99)
GLUCOSE BLD-MCNC: 206 MG/DL (ref 70–99)
GLUCOSE BLD-MCNC: 263 MG/DL (ref 70–99)
INR BLD: 3.32 (ref 0.86–1.14)
MAGNESIUM: 2 MG/DL (ref 1.8–2.4)
PERFORMED ON: ABNORMAL
POTASSIUM SERPL-SCNC: 3.6 MMOL/L (ref 3.5–5.1)
PROTHROMBIN TIME: 39 SEC (ref 10–13.2)
SODIUM BLD-SCNC: 144 MMOL/L (ref 136–145)

## 2020-06-13 PROCEDURE — 2580000003 HC RX 258: Performed by: INTERNAL MEDICINE

## 2020-06-13 PROCEDURE — 6370000000 HC RX 637 (ALT 250 FOR IP): Performed by: FAMILY MEDICINE

## 2020-06-13 PROCEDURE — 80048 BASIC METABOLIC PNL TOTAL CA: CPT

## 2020-06-13 PROCEDURE — 6370000000 HC RX 637 (ALT 250 FOR IP): Performed by: HOSPITALIST

## 2020-06-13 PROCEDURE — 36415 COLL VENOUS BLD VENIPUNCTURE: CPT

## 2020-06-13 PROCEDURE — 85610 PROTHROMBIN TIME: CPT

## 2020-06-13 PROCEDURE — 83735 ASSAY OF MAGNESIUM: CPT

## 2020-06-13 PROCEDURE — 2060000000 HC ICU INTERMEDIATE R&B

## 2020-06-13 RX ORDER — QUETIAPINE FUMARATE 25 MG/1
25 TABLET, FILM COATED ORAL NIGHTLY
Status: DISCONTINUED | OUTPATIENT
Start: 2020-06-13 | End: 2020-06-16

## 2020-06-13 RX ORDER — MIRTAZAPINE 15 MG/1
15 TABLET, FILM COATED ORAL NIGHTLY
Status: DISCONTINUED | OUTPATIENT
Start: 2020-06-13 | End: 2020-06-17 | Stop reason: HOSPADM

## 2020-06-13 RX ORDER — DONEPEZIL HYDROCHLORIDE 5 MG/1
10 TABLET, FILM COATED ORAL NIGHTLY
Status: DISCONTINUED | OUTPATIENT
Start: 2020-06-13 | End: 2020-06-16

## 2020-06-13 RX ORDER — MEMANTINE HYDROCHLORIDE 5 MG/1
10 TABLET ORAL 2 TIMES DAILY
Status: DISCONTINUED | OUTPATIENT
Start: 2020-06-13 | End: 2020-06-16

## 2020-06-13 RX ADMIN — MIRTAZAPINE 15 MG: 15 TABLET, FILM COATED ORAL at 21:29

## 2020-06-13 RX ADMIN — MEMANTINE 10 MG: 5 TABLET ORAL at 21:30

## 2020-06-13 RX ADMIN — DESMOPRESSIN ACETATE 40 MG: 0.2 TABLET ORAL at 11:58

## 2020-06-13 RX ADMIN — TAMSULOSIN HYDROCHLORIDE 0.4 MG: 0.4 CAPSULE ORAL at 11:58

## 2020-06-13 RX ADMIN — PANTOPRAZOLE SODIUM 40 MG: 40 TABLET, DELAYED RELEASE ORAL at 11:58

## 2020-06-13 RX ADMIN — ATENOLOL 25 MG: 25 TABLET ORAL at 11:58

## 2020-06-13 RX ADMIN — DONEPEZIL HYDROCHLORIDE 10 MG: 5 TABLET, FILM COATED ORAL at 21:30

## 2020-06-13 RX ADMIN — POTASSIUM BICARBONATE 40 MEQ: 782 TABLET, EFFERVESCENT ORAL at 11:58

## 2020-06-13 RX ADMIN — MEMANTINE 10 MG: 5 TABLET ORAL at 11:58

## 2020-06-13 RX ADMIN — INSULIN GLARGINE 5 UNITS: 100 INJECTION, SOLUTION SUBCUTANEOUS at 21:25

## 2020-06-13 RX ADMIN — SODIUM CHLORIDE, POTASSIUM CHLORIDE, SODIUM LACTATE AND CALCIUM CHLORIDE: 600; 310; 30; 20 INJECTION, SOLUTION INTRAVENOUS at 22:38

## 2020-06-13 RX ADMIN — INSULIN LISPRO 2 UNITS: 100 INJECTION, SOLUTION INTRAVENOUS; SUBCUTANEOUS at 21:25

## 2020-06-13 RX ADMIN — QUETIAPINE FUMARATE 25 MG: 25 TABLET ORAL at 21:30

## 2020-06-13 ASSESSMENT — PAIN SCALES - GENERAL: PAINLEVEL_OUTOF10: 0

## 2020-06-13 NOTE — PROGRESS NOTES
dry/intact  CNS: alert, no agitation     Labs:  CBC:   No results for input(s): WBC, HGB, PLT in the last 72 hours. BMP:    Recent Labs     06/11/20  0455 06/12/20  0507 06/13/20  1011    145 144   K 3.6 3.1* 3.6   * 115* 111*   CO2 18* 20* 23   BUN 75* 56* 37*   CREATININE 3.5* 2.3* 1.8*   GLUCOSE 111* 110* 145*     Ca/Mg/Phos:   Recent Labs     06/11/20  0455 06/12/20  0506 06/12/20  0507 06/13/20  1011 06/13/20  1012   CALCIUM 8.1*  --  8.2* 8.5  --    MG  --  1.50*  --   --  2.00     Hepatic:   No results for input(s): AST, ALT, ALB, BILITOT, ALKPHOS in the last 72 hours. Troponin:   No results for input(s): TROPONINI in the last 72 hours. BNP: No results for input(s): BNP in the last 72 hours. Lipids: No results for input(s): CHOL, TRIG, HDL, LDLCALC, LABVLDL in the last 72 hours. ABGs: No results for input(s): PHART, PO2ART, GRU2IGC in the last 72 hours. INR:   Recent Labs     06/11/20  0455 06/12/20  0507 06/13/20  1011   INR 2.52* 2.80* 3.32*     UA:  No results for input(s): Coyle Fairy, GLUCOSEU, BILIRUBINUR, KETUA, SPECGRAV, BLOODU, PHUR, PROTEINU, UROBILINOGEN, NITRU, LEUKOCYTESUR, Iesha Fortis in the last 72 hours. Urine Microscopic:   No results for input(s): LABCAST, BACTERIA, COMU, HYALCAST, WBCUA, RBCUA, EPIU in the last 72 hours. Urine Culture: No results for input(s): LABURIN in the last 72 hours. Urine Chemistry: No results for input(s): Elena Yao, PROTEINUR, NAUR in the last 72 hours. IMAGING:  XR CHEST PORTABLE   Final Result   Stable moderate cardiomegaly. CT ABDOMEN PELVIS WO CONTRAST   Final Result   Subtle injection of the fat surrounds the bladder. Recommend correlation   with urinalysis to exclude cystitis. There is prostatomegaly      Punctate calcifications in the right and left kidney, either vascular or   nonobstructing renal stones. No hydronephrosis      Cholelithiasis               Assessment/Plan :      1. ROSE .  2/2 Urinary obstruction   Creatinine is downtrending   Has BPH and on flomax    Recommend to dose adjust all medications  based on renal functions  Maintain SBP> 90 mmHg   Daily weights   AVOID NSAIDs  Avoid Nephrotoxins  Monitor Intake/Output      2. HTN. BP fairly controlled     3. metabolic acidosis 2/2 ROSE. dec LR     5. Hypokalemia - supplemented     6.  Hypomagnesemia - supplemented      Will continue to monitor                 Thank you for allowing us to participate in care of Gaby Stewart         Electronically signed by: Trell Dietz MD, 6/13/2020,     [facility] [address] [place] [address]  Office : [phone]  Fax :[phone]

## 2020-06-14 LAB
ANION GAP SERPL CALCULATED.3IONS-SCNC: 7 MMOL/L (ref 3–16)
BUN BLDV-MCNC: 39 MG/DL (ref 7–20)
CALCIUM SERPL-MCNC: 8.3 MG/DL (ref 8.3–10.6)
CHLORIDE BLD-SCNC: 112 MMOL/L (ref 99–110)
CO2: 23 MMOL/L (ref 21–32)
CREAT SERPL-MCNC: 1.9 MG/DL (ref 0.8–1.3)
CULTURE, BLOOD 2: NORMAL
GFR AFRICAN AMERICAN: 41
GFR NON-AFRICAN AMERICAN: 34
GLUCOSE BLD-MCNC: 123 MG/DL (ref 70–99)
GLUCOSE BLD-MCNC: 153 MG/DL (ref 70–99)
GLUCOSE BLD-MCNC: 177 MG/DL (ref 70–99)
GLUCOSE BLD-MCNC: 206 MG/DL (ref 70–99)
GLUCOSE BLD-MCNC: 267 MG/DL (ref 70–99)
INR BLD: 2.49 (ref 0.86–1.14)
PERFORMED ON: ABNORMAL
POTASSIUM SERPL-SCNC: 3.2 MMOL/L (ref 3.5–5.1)
PROTHROMBIN TIME: 29.1 SEC (ref 10–13.2)
SODIUM BLD-SCNC: 142 MMOL/L (ref 136–145)

## 2020-06-14 PROCEDURE — 6370000000 HC RX 637 (ALT 250 FOR IP): Performed by: FAMILY MEDICINE

## 2020-06-14 PROCEDURE — 2580000003 HC RX 258: Performed by: INTERNAL MEDICINE

## 2020-06-14 PROCEDURE — 2060000000 HC ICU INTERMEDIATE R&B

## 2020-06-14 PROCEDURE — 80048 BASIC METABOLIC PNL TOTAL CA: CPT

## 2020-06-14 PROCEDURE — 6370000000 HC RX 637 (ALT 250 FOR IP): Performed by: HOSPITALIST

## 2020-06-14 PROCEDURE — 6370000000 HC RX 637 (ALT 250 FOR IP): Performed by: UROLOGY

## 2020-06-14 PROCEDURE — 85610 PROTHROMBIN TIME: CPT

## 2020-06-14 PROCEDURE — 36415 COLL VENOUS BLD VENIPUNCTURE: CPT

## 2020-06-14 RX ORDER — FINASTERIDE 5 MG/1
5 TABLET, FILM COATED ORAL DAILY
Status: DISCONTINUED | OUTPATIENT
Start: 2020-06-14 | End: 2020-06-17 | Stop reason: HOSPADM

## 2020-06-14 RX ORDER — WARFARIN SODIUM 1 MG/1
1.5 TABLET ORAL
Status: COMPLETED | OUTPATIENT
Start: 2020-06-14 | End: 2020-06-14

## 2020-06-14 RX ADMIN — WARFARIN SODIUM 1.5 MG: 1 TABLET ORAL at 17:12

## 2020-06-14 RX ADMIN — DONEPEZIL HYDROCHLORIDE 10 MG: 5 TABLET, FILM COATED ORAL at 21:10

## 2020-06-14 RX ADMIN — MEMANTINE 10 MG: 5 TABLET ORAL at 10:23

## 2020-06-14 RX ADMIN — TAMSULOSIN HYDROCHLORIDE 0.4 MG: 0.4 CAPSULE ORAL at 10:23

## 2020-06-14 RX ADMIN — POTASSIUM BICARBONATE 40 MEQ: 782 TABLET, EFFERVESCENT ORAL at 10:23

## 2020-06-14 RX ADMIN — MEMANTINE 10 MG: 5 TABLET ORAL at 21:10

## 2020-06-14 RX ADMIN — QUETIAPINE FUMARATE 25 MG: 25 TABLET ORAL at 21:10

## 2020-06-14 RX ADMIN — INSULIN LISPRO 1 UNITS: 100 INJECTION, SOLUTION INTRAVENOUS; SUBCUTANEOUS at 10:24

## 2020-06-14 RX ADMIN — PANTOPRAZOLE SODIUM 40 MG: 40 TABLET, DELAYED RELEASE ORAL at 10:23

## 2020-06-14 RX ADMIN — INSULIN LISPRO 1 UNITS: 100 INJECTION, SOLUTION INTRAVENOUS; SUBCUTANEOUS at 21:28

## 2020-06-14 RX ADMIN — MIRTAZAPINE 15 MG: 15 TABLET, FILM COATED ORAL at 21:10

## 2020-06-14 RX ADMIN — FINASTERIDE 5 MG: 5 TABLET, FILM COATED ORAL at 13:29

## 2020-06-14 RX ADMIN — SODIUM CHLORIDE, POTASSIUM CHLORIDE, SODIUM LACTATE AND CALCIUM CHLORIDE: 600; 310; 30; 20 INJECTION, SOLUTION INTRAVENOUS at 22:40

## 2020-06-14 RX ADMIN — ATENOLOL 25 MG: 25 TABLET ORAL at 10:23

## 2020-06-14 RX ADMIN — INSULIN LISPRO 3 UNITS: 100 INJECTION, SOLUTION INTRAVENOUS; SUBCUTANEOUS at 17:03

## 2020-06-14 RX ADMIN — DESMOPRESSIN ACETATE 40 MG: 0.2 TABLET ORAL at 10:23

## 2020-06-14 RX ADMIN — INSULIN GLARGINE 5 UNITS: 100 INJECTION, SOLUTION SUBCUTANEOUS at 21:29

## 2020-06-14 NOTE — PROGRESS NOTES
examination, the patient was noted to have a large distended palpable bladder extending into his upper abdominal field. Jimenez catheter was placed in ED and resulted in drainage of 1.5 L of rust colored urine. Nephrology was consulted and hospitalist team contacted to admit patient for close observation and treatment of his ROSE. per son, over the past few months he has been having visual hallucinations and worsening confusion. He has been lethargic, not speaking or moving much since discharge a week ago. He walked with a walker or cane by himself before this week. Plan:  ROSE on ckd III - improving  - crt baseline 1-1.2, 5.4 on admission -> 4.8 -> 1.9 crt unchanged today  - due to obstructive nephropathy   - IVF  - Avoid nephrotoxins  - nephrology consulted    Urinary retention   - due to bph  - jimenez placed, will need for at least one week with a VT after discharge.  He will follow up with urology in the out pt setting  - cont flomax   - will need to restart seroquel  - urology consulted    Acute metabolic encephalopathy - improving  - due to uremia  - blood cultures ngtd  - head CT negative  - TSH - .68  - treat associated conditions  - avoid sedating meds as able  - supportive care    Diabetes  - stable  - hold home PO meds  - reduce lantus, hold prandial as he is not eating much  - SSI, accuchecks    paf   - currently rate controlled  - continue bb   - On Coumadin; monitor daily PT/INR, today 2.49    Dementia  - cont aricept  - pt is at very high risk for delirium, will minimize sedating meds, avoid constipation, control pain, frequent reorientation   - PT/OT - rec snf after discharge  - restarted home seroquel at a lower dose  - started Remeron for sleep and appetite     Code status: limited, dnr/dni  DVT prophylaxis: [] Lovenox  [] SQ Heparin  [] SCDs because of  [x] warfarin/oral direct thrombin inhibitor [] Encourage ambulation      Disposition:  [] Home [] Rehab [] Psych [] SNF  [] LTAC  [] Transfer to ICU  [] Transfer to PCU [] Other: in pt, will be ready for discharge when precert is obtained      Electronically signed by Emilee Burnham DO on 6/14/2020 at 9:17 AM

## 2020-06-14 NOTE — PROGRESS NOTES
Office : [phone]     Fax :[phone]       Nephrology  Note        Chief Complaint:    Chief Complaint   Patient presents with    Fatigue     Patient arrives via medic with c.c of lethargy. Patient was admitted to a psychiatric facility in [facility] per patients family he has not acted right since he has been home. Per medics family did not state any new medication use that they are aware of . Patient is alert to name. Patient is disoriented to time, place, and situation. Resp e.u and skin warm and dry. History of Present Ilness:    Mouna Brown is a 82 y.o. male with h/o atrial fib, DM2 , HTN, OA, was brought to the ED for evaluation of increased lethargy, confusion and altered mental status since his discharge from a geriatric psychiatric facility   His son reports that he has recently been diagnosed with dementia and that he was not initiated on any new medications following discharge from his psychiatric stay. Labs were obtained and notable for a severe acute kidney injury with a creatinine of 5.4 and high anion gap metabolic acidosis. He was noted to have urinary retention. Rosas catheter was placed in ED and resulted in drainage of 1.5 L urine. He is awake but confused.    Vitals stable     Interval HX     INTERVAL HISTORY    Feels same  Shortness of breath: No   UOP: Fair  Creat: stable      Past Medical History:   Diagnosis Date    [facility]     Arthritis     CKD (chronic kidney disease) stage 2, GFR 60-89 ml/min 12/22/2018    Diabetes mellitus ([facility]     GERD (gastroesophageal reflux disease) 12/22/2018    High blood pressure     Hyperlipidemia     Osteoarthritis     Type II or unspecified type diabetes mellitus without mention of complication, not stated as uncontrolled     Vitamin D deficiency        Past Surgical History:   Procedure Laterality Date    BACK SURGERY  1979    BACK SURGERY      low disc    ELBOW SURGERY  1988    Right    KNEE ARTHROSCOPY  2000    Right    KNEE ARTHROSCOPY  2001    Left       Family History   Problem Relation Age of Onset    Arthritis Other     Cancer Other     Heart Disease Other     Stroke Other          Current Medications:    warfarin (COUMADIN) tablet 1.5 mg, Once  warfarin (COUMADIN) daily dosing (placeholder), RX Placeholder  mirtazapine (REMERON) tablet 15 mg, Nightly  QUEtiapine (SEROQUEL) tablet 25 mg, Nightly  donepezil (ARICEPT) tablet 10 mg, Nightly  memantine (NAMENDA) tablet 10 mg, BID  potassium bicarb-citric acid (EFFER-K) effervescent tablet 40 mEq, Daily  lactated ringers infusion, Continuous  sennosides-docusate sodium (SENOKOT-S) 8.6-50 MG tablet 1 tablet, Daily PRN  insulin glargine (LANTUS;BASAGLAR) injection pen 5 Units, Nightly  atorvastatin (LIPITOR) tablet 40 mg, Daily  atenolol (TENORMIN) tablet 25 mg, Daily  pantoprazole (PROTONIX) tablet 40 mg, QAM AC  tamsulosin (FLOMAX) capsule 0.4 mg, Daily  glucose (GLUTOSE) 40 % oral gel 15 g, PRN  dextrose 50 % IV solution, PRN  glucagon (rDNA) injection 1 mg, PRN  dextrose 5 % solution, PRN  acetaminophen (TYLENOL) tablet 650 mg, Q6H PRN    Or  acetaminophen (TYLENOL) suppository 650 mg, Q6H PRN  ondansetron (ZOFRAN-ODT) disintegrating tablet 4 mg, Q8H PRN    Or  ondansetron (ZOFRAN) injection 4 mg, Q6H PRN  insulin lispro (1 Unit Dial) 0-6 Units, TID WC  insulin lispro (1 Unit Dial) 0-3 Units, Nightly          Physical exam:     Vitals:  /70   Pulse 72   Temp 98.1 °F (36.7 °C) (Axillary)   Resp 16   Ht 5' 7\" (1.702 m)   Wt 172 lb 11.2 oz (78.3 kg)   SpO2 99%   BMI 27.05 kg/m²   Constitutional:  awake, NAD  HEENT:  MMM  Neck: supple, no JVD  Cardiovascular:  S1, S2 reg  Respiratory: CTA  Abdomen:  +BS, soft, ND  Ext: no lower :      1. ROSE . 2/2  Urinary obstruction   Creatinine is downtrending   Has BPH and on flomax    Recommend to dose adjust all medications  based on renal functions  Maintain SBP> 90 mmHg   Daily weights   AVOID NSAIDs  Avoid Nephrotoxins  Monitor Intake/Output      2. HTN. BP fairly controlled     3. metabolic acidosis 2/2 ROSE. on LR     5. Hypokalemia - supplement     6.  Hypomagnesemia - supplemented      Will continue to monitor       Spoke to Marianne Julian DO           Thank you for allowing us to participate in care of Eugenia Daniels         Electronically signed by: Blanca Vyas MD, 6/14/2020,     [facility]  Office : [phone]  Fax :[phone]

## 2020-06-14 NOTE — PROGRESS NOTES
Reyes Freund is a 82 y.o. male patient. 1. ROSE [facility]acute [facility]) ([facility]    2. Retention of urine    3.  Altered mental status, unspecified altered mental status type      Past Medical History:   Diagnosis Date    [facility]     Arthritis     CKD (chronic kidney disease) stage 2, GFR 60-89 ml/min 12/22/2018    Diabetes mellitus ([facility]     GERD (gastroesophageal reflux disease) 12/22/2018    High blood pressure     Hyperlipidemia     Osteoarthritis     Type II or unspecified type diabetes mellitus without mention of complication, not stated as uncontrolled     Vitamin D deficiency      Past Surgical History Pertinent Negatives:   Procedure Date Noted    APPENDECTOMY 05/19/2013    BRAIN SURGERY 05/19/2013    CARDIAC VALVE REPLACEMENT 05/19/2013    CHOLECYSTECTOMY 05/19/2013    COLON SURGERY 05/19/2013    COLONOSCOPY 05/19/2013    CORONARY ARTERY BYPASS GRAFT 05/19/2013    COSMETIC SURGERY 05/19/2013    EYE SURGERY 05/19/2013    FRACTURE SURGERY 05/19/2013    HERNIA REPAIR 05/19/2013    JOINT REPLACEMENT 05/19/2013    PROSTATE SURGERY 05/19/2013    SMALL INTESTINE SURGERY 05/19/2013    SPINE SURGERY 05/19/2013    UPPER GASTROINTESTINAL ENDOSCOPY 05/19/2013    VASECTOMY 05/19/2013     Scheduled Meds:   warfarin  1.5 mg Oral Once    finasteride  5 mg Oral Daily    warfarin (COUMADIN) daily dosing (placeholder)   Other RX Placeholder    mirtazapine  15 mg Oral Nightly    QUEtiapine  25 mg Oral Nightly    donepezil  10 mg Oral Nightly    memantine  10 mg Oral BID    potassium bicarb-citric acid  40 mEq Oral Daily    insulin glargine  5 Units Subcutaneous Nightly    atorvastatin  40 mg Oral Daily    atenolol  25 mg Oral Daily    pantoprazole  40 mg Oral QAM AC    tamsulosin  0.4 mg Oral Daily    insulin lispro  0-6 Units Subcutaneous TID WC    insulin lispro  0-3 Units Subcutaneous Nightly     Continuous Infusions:   lactated ringers 75 mL/hr at 06/13/20 2238    dextrose PRN Meds:sennosides-docusate sodium, glucose, dextrose, glucagon (rDNA), dextrose, acetaminophen **OR** acetaminophen, ondansetron **OR** ondansetron    No Known Allergies  Principal Problem:    Acute kidney injury superimposed on chronic kidney disease ([facility]  Active Problems:    Diabetes mellitus ([facility]    Hypertension    Acute retention of urine    Altered mental status  Resolved Problems:    * No resolved hospital problems. *    Blood pressure 135/86, pulse 77, temperature 98.3 °F (36.8 °C), temperature source Axillary, resp. rate 16, height 5' 7\" (1.702 m), weight 172 lb 11.2 oz (78.3 kg), SpO2 97 %. Subjective:   Diet: Adequate intake. Activity level: Normal.    Pain control: Well controlled.       Objective  Assessment & Plan      Retention  Renal failure  bph    recc    Creat 1.9 which may be christin  Add finasteride  voing trial this week  Not sure would be candidate for prostatectomy  George Gleason MD  6/14/2020

## 2020-06-15 PROBLEM — G31.84 MILD COGNITIVE IMPAIRMENT: Status: ACTIVE | Noted: 2020-06-15

## 2020-06-15 LAB
ANION GAP SERPL CALCULATED.3IONS-SCNC: 6 MMOL/L (ref 3–16)
BUN BLDV-MCNC: 37 MG/DL (ref 7–20)
CALCIUM SERPL-MCNC: 8 MG/DL (ref 8.3–10.6)
CHLORIDE BLD-SCNC: 110 MMOL/L (ref 99–110)
CO2: 25 MMOL/L (ref 21–32)
CREAT SERPL-MCNC: 1.8 MG/DL (ref 0.8–1.3)
GFR AFRICAN AMERICAN: 44
GFR NON-AFRICAN AMERICAN: 36
GLUCOSE BLD-MCNC: 151 MG/DL (ref 70–99)
GLUCOSE BLD-MCNC: 175 MG/DL (ref 70–99)
GLUCOSE BLD-MCNC: 194 MG/DL (ref 70–99)
GLUCOSE BLD-MCNC: 223 MG/DL (ref 70–99)
GLUCOSE BLD-MCNC: 226 MG/DL (ref 70–99)
GLUCOSE BLD-MCNC: 238 MG/DL (ref 70–99)
INR BLD: 1.84 (ref 0.86–1.14)
PERFORMED ON: ABNORMAL
POTASSIUM SERPL-SCNC: 3.9 MMOL/L (ref 3.5–5.1)
PROTHROMBIN TIME: 21.5 SEC (ref 10–13.2)
SODIUM BLD-SCNC: 141 MMOL/L (ref 136–145)

## 2020-06-15 PROCEDURE — 6370000000 HC RX 637 (ALT 250 FOR IP): Performed by: HOSPITALIST

## 2020-06-15 PROCEDURE — 6370000000 HC RX 637 (ALT 250 FOR IP): Performed by: UROLOGY

## 2020-06-15 PROCEDURE — 2060000000 HC ICU INTERMEDIATE R&B

## 2020-06-15 PROCEDURE — 97116 GAIT TRAINING THERAPY: CPT

## 2020-06-15 PROCEDURE — 97530 THERAPEUTIC ACTIVITIES: CPT

## 2020-06-15 PROCEDURE — 6370000000 HC RX 637 (ALT 250 FOR IP): Performed by: INTERNAL MEDICINE

## 2020-06-15 PROCEDURE — 85610 PROTHROMBIN TIME: CPT

## 2020-06-15 PROCEDURE — 80048 BASIC METABOLIC PNL TOTAL CA: CPT

## 2020-06-15 PROCEDURE — 36415 COLL VENOUS BLD VENIPUNCTURE: CPT

## 2020-06-15 PROCEDURE — 6370000000 HC RX 637 (ALT 250 FOR IP): Performed by: FAMILY MEDICINE

## 2020-06-15 PROCEDURE — 97535 SELF CARE MNGMENT TRAINING: CPT

## 2020-06-15 RX ORDER — WARFARIN SODIUM 1 MG/1
1.5 TABLET ORAL DAILY
Status: DISCONTINUED | OUTPATIENT
Start: 2020-06-15 | End: 2020-06-16

## 2020-06-15 RX ADMIN — POTASSIUM BICARBONATE 40 MEQ: 782 TABLET, EFFERVESCENT ORAL at 10:43

## 2020-06-15 RX ADMIN — FINASTERIDE 5 MG: 5 TABLET, FILM COATED ORAL at 10:43

## 2020-06-15 RX ADMIN — DONEPEZIL HYDROCHLORIDE 10 MG: 5 TABLET, FILM COATED ORAL at 21:06

## 2020-06-15 RX ADMIN — ATENOLOL 25 MG: 25 TABLET ORAL at 10:43

## 2020-06-15 RX ADMIN — MEMANTINE 10 MG: 5 TABLET ORAL at 10:44

## 2020-06-15 RX ADMIN — WARFARIN SODIUM 1.5 MG: 1 TABLET ORAL at 17:17

## 2020-06-15 RX ADMIN — INSULIN LISPRO 1 UNITS: 100 INJECTION, SOLUTION INTRAVENOUS; SUBCUTANEOUS at 09:17

## 2020-06-15 RX ADMIN — DESMOPRESSIN ACETATE 40 MG: 0.2 TABLET ORAL at 10:43

## 2020-06-15 RX ADMIN — INSULIN LISPRO 1 UNITS: 100 INJECTION, SOLUTION INTRAVENOUS; SUBCUTANEOUS at 21:15

## 2020-06-15 RX ADMIN — INSULIN LISPRO 1 UNITS: 100 INJECTION, SOLUTION INTRAVENOUS; SUBCUTANEOUS at 17:17

## 2020-06-15 RX ADMIN — MIRTAZAPINE 15 MG: 15 TABLET, FILM COATED ORAL at 21:06

## 2020-06-15 RX ADMIN — INSULIN LISPRO 1 UNITS: 100 INJECTION, SOLUTION INTRAVENOUS; SUBCUTANEOUS at 12:32

## 2020-06-15 RX ADMIN — MEMANTINE 10 MG: 5 TABLET ORAL at 21:06

## 2020-06-15 RX ADMIN — QUETIAPINE FUMARATE 25 MG: 25 TABLET ORAL at 21:06

## 2020-06-15 RX ADMIN — PANTOPRAZOLE SODIUM 40 MG: 40 TABLET, DELAYED RELEASE ORAL at 10:43

## 2020-06-15 RX ADMIN — INSULIN GLARGINE 5 UNITS: 100 INJECTION, SOLUTION SUBCUTANEOUS at 21:16

## 2020-06-15 RX ADMIN — TAMSULOSIN HYDROCHLORIDE 0.4 MG: 0.4 CAPSULE ORAL at 10:43

## 2020-06-15 ASSESSMENT — PAIN SCALES - GENERAL
PAINLEVEL_OUTOF10: 0
PAINLEVEL_OUTOF10: 0

## 2020-06-15 ASSESSMENT — PAIN SCALES - PAIN ASSESSMENT IN ADVANCED DEMENTIA (PAINAD)
BREATHING: 0
NEGVOCALIZATION: 0
BODYLANGUAGE: 0
TOTALSCORE: 0
FACIALEXPRESSION: 0
CONSOLABILITY: 0

## 2020-06-15 ASSESSMENT — PAIN SCALES - WONG BAKER
WONGBAKER_NUMERICALRESPONSE: 0

## 2020-06-15 NOTE — PROGRESS NOTES
Orientation  Orientation  Orientation Level: Oriented to person;Disoriented to situation;Disoriented to time;Disoriented to place  Objective    ADL  UE Bathing: Stand by assistance;Setup  LE Bathing: Moderate assistance  UE Dressing: Minimal assistance;Setup;Verbal cueing(gown change)  Additional Comments: Pt completed UB dressing and UB/LB bathing (warm wipes) seated EOB. Requires min cues for thoroughness and attention to task. Balance  Sitting Balance: Stand by assistance  Standing Balance: Dependent/Total(mod x2 d/t posterior lean, progressing to mod+min with ambulation)  Functional Mobility  Functional - Mobility Device: Rolling Walker  Activity: Other  Assist Level: Dependent/Total(mod + min)  Functional Mobility Comments: EOB > hallway > recliner ~25' total  Bed mobility  Rolling to Left: Maximum assistance  Supine to Sit: Dependent/Total;2 Person assistance(max + min)  Scooting: Dependent/Total  Transfers  Sit to stand: Maximum assistance  Stand to sit: Maximum assistance                       Cognition  Overall Cognitive Status: Exceptions  Arousal/Alertness: Delayed responses to stimuli  Following Commands: Follows one step commands with increased time; Follows one step commands with repetition  Attention Span: Attends with cues to redirect; Difficulty dividing attention  Memory: Decreased recall of recent events;Decreased short term memory  Safety Judgement: Decreased awareness of need for safety  Problem Solving: Decreased awareness of errors;Assistance required to identify errors made;Assistance required to generate solutions;Assistance required to implement solutions  Insights: Decreased awareness of deficits  Initiation: Requires cues for some  Sequencing: Requires cues for some                                         Plan   Plan  Times per week: 3-5  Times per day: Daily  Specific instructions for Next Treatment: COTX  Current Treatment Recommendations: Strengthening, Balance Training, Endurance Training, Safety Education & Training, Self-Care / ADL  G-Code     OutComes Score                                                  AM-PAC Score        AM-PAC Inpatient Daily Activity Raw Score: 13 (06/15/20 1236)  AM-PAC Inpatient ADL T-Scale Score : 32.03 (06/15/20 1236)  ADL Inpatient CMS 0-100% Score: 63.03 (06/15/20 1236)  ADL Inpatient CMS G-Code Modifier : CL (06/15/20 1236)    Goals  Short term goals  Time Frame for Short term goals: Discharge  Short term goal 1: Bed mobility maxA-- continue for consistency 6/15  Short term goal 2: Functional ADL transfer modA-- ongoing 6/15  Short term goal 3: Simple grooming modA-- goal met 6/11  Short term goal 4: NEW GOAL: Functional mobility for ADL completion with ModA -- ongoing 6/15  Short term goal 5: New GOAL: UB ADLs with SUP-- ongoing 6/15  Long term goals  Time Frame for Long term goals : LTG=STG  Long term goal 1: NEW GOAL: LB dressing w/ Melissa and AE as needed-- ongoing 6/15  Patient Goals   Patient goals : Not stated.  Son stated (via phone) SNF then hopeful to transition patient home       Therapy Time   Individual Concurrent Group Co-treatment   Time In       1136   Time Out       1214   Minutes       38      Timed Code Treatment Minutes:  38 minutes    Total Treatment Minutes:  38 minutes    Favian Goddard OTR/L [id number]    Refugio Smith OT

## 2020-06-15 NOTE — ADT AUTH CERT
BUN 56 mg/dL      CREATININE 2.3 mg/dL        Review/Comments:   ROSE on ckd III - improving   - crt baseline 1-1.2, 5.4 on admission -> 4.8 -> 2.3   - due to obstructive nephropathy    - IVF   - Avoid nephrotoxins   - nephrology consulted       Urinary retention    - due to bph   - jimenez placed, will need for at least one week with a VT after discharge. He will follow up with urology in the out pt setting   - cont flomax    - hold seroquel for now   - urology consulted       Acute metabolic encephalopathy - improving   - due to uremia   - blood cultures ngtd   - head CT negative   - TSH - .68   - treat associated conditions   - avoid sedating meds as able   - supportive care       Diabetes   - stable   - hold home PO meds   - reduce lantus, hold prandial as he is not eating much   - SSI, accuchecks       paf    - currently rate controlled   - continue bb    - On Coumadin; monitor daily PT/INR, today 2.8       Dementia   - pt is at very high risk for delirium, will minimize sedating meds, remove jimenez as soon as able, avoid constipation, control pain, frequent reorientation    - PT/OT       Nephrology MD Notes   1. ROSE . 2/2  Urinary obstruction    Creatinine is downtrending    Has BPH and on flomax       Recommend to dose adjust all medications  based on renal functions   Maintain SBP> 90 mmHg    Daily weights    AVOID NSAIDs   Avoid Nephrotoxins   Monitor Intake/Output        2. HTN. BP fairly controlled        3. metabolic acidosis 2/2 ROSE. Will change iv fluids to LR now        5. Hypokalemia - will supplement iv        6.  Hypomagnesemia - supplement iv       Anticipated Discharge Plan: tbd           Renal Failure, Acute - Care Day 3 (6/11/2020) by Tika Mar RN         Review Status Review Entered   Completed 6/12/2020 10:04       Criteria Review      Care Day: 3 Care Date: 6/11/2020 Level of Care:    Guideline Day 3    Level Of Care    (X) Floor    Clinical Status    (X) * Mental status at baseline continues to improve   Flomax 0.4 mg daily   Will need close monitoring of bladder residuals after voiding trial; high risk of failure   Outpatient urology followup requested

## 2020-06-15 NOTE — PROGRESS NOTES
Treatment Recommendations: Strengthening, Transfer Training, Endurance Training, Neuromuscular Re-education, Balance Training, Functional Mobility Training, Gait Training, Safety Education & Training, Equipment Evaluation, Education, & procurement, Patient/Caregiver Education & Training, ADL/Self-care Training  Safety Devices  Type of devices:  All fall risk precautions in place, Call light within reach, Chair alarm in place, Gait belt, Patient at risk for falls, Left in chair, Nurse notified  Restraints  Initially in place: No     Therapy Time   Individual Concurrent Group Co-treatment   Time In       1136   Time Out       1214   Minutes       38   Timed Code Treatment Minutes: 2102 John Woodson, PT   Margarette Mckeon, [place], DPT, [id number]

## 2020-06-15 NOTE — PROGRESS NOTES
in the next 24-48 hrs    ____________________________________________________________________________    Subjective:   Overnight Events:   Uneventful overnight  Remains somnolent this morning though arousable and appropriate in answering questions      Physical Exam:  BP (!) 126/90   Pulse 70   Temp 97.8 °F (36.6 °C) (Axillary)   Resp 14   Ht 5' 7\" (1.702 m)   Wt 170 lb 1.6 oz (77.2 kg)   SpO2 98%   BMI 26.64 kg/m²   General appearance: No apparent distress, appears stated age and cooperative. HEENT: Normocephalic, atraumatic, MMM, No sclera icterus/conjuctival palor  Neck: Supple, no thyromegally. No jugular venous distention. Respiratory:  Normal respiratory effort. Clear to auscultation, no Rales/Wheezes/Rhonchi. Cardiovascular: S1/S2 without murmurs, rubs or gallops. RRR  Abdomen: Soft, non-tender, non-distended, bowel sounds present. Musculoskeletal: No clubbing, cyanosis or edema bilaterally. Skin: Skin color, texture, turgor normal.  No rashes or lesions. Neurologic:  Cranial nerves: II-XII intact, ABBY, No focal sensory/motor deficits  Psychiatric: Alert and oriented, thought content appropriate  Capillary Refill: Brisk,< 3 seconds   Peripheral Pulses: +2 palpable, equal bilaterally       Intake/Output Summary (Last 24 hours) at 6/15/2020 1048  Last data filed at 6/15/2020 0408  Gross per 24 hour   Intake 3094 ml   Output 500 ml   Net 2594 ml       Labs:   No results for input(s): WBC, HGB, HCT, PLT in the last 72 hours. Invalid input(s):  INR   Recent Labs     06/13/20  1011 06/14/20  0504 06/15/20  0433    142 141   K 3.6 3.2* 3.9   * 112* 110   CO2 23 23 25   BUN 37* 39* 37*   CREATININE 1.8* 1.9* 1.8*   CALCIUM 8.5 8.3 8.0*     No results for input(s): CKTOTAL, TROPONINI in the last 72 hours.     Urinalysis:    Lab Results   Component Value Date    NITRU Negative 06/09/2020    WBCUA 6 06/09/2020    BACTERIA Rare 02/24/2014    RBCUA 9 06/09/2020    BLOODU SMALL 06/09/2020 [address] 1.014 06/09/2020    GLUCOSEU Negative 06/09/2020       Radiology:  XR CHEST PORTABLE   Final Result   Stable moderate cardiomegaly. CT ABDOMEN PELVIS WO CONTRAST   Final Result   Subtle injection of the fat surrounds the bladder. Recommend correlation   with urinalysis to exclude cystitis. There is prostatomegaly      Punctate calcifications in the right and left kidney, either vascular or   nonobstructing renal stones. No hydronephrosis      Cholelithiasis                 Ravi Chatman MD      Please excuse brevity and/or typos. This report was transcribed using voice recognition software. Every effort was made to ensure accuracy, however, inadvertent computerized transcription errors may be present.

## 2020-06-15 NOTE — PROGRESS NOTES
ND  Ext: no lower extremity edema  Skin: dry/intact  CNS: alert, no agitation     Labs:  CBC:   No results for input(s): WBC, HGB, PLT in the last 72 hours. BMP:    Recent Labs     06/13/20  1011 06/14/20  0504 06/15/20  0433    142 141   K 3.6 3.2* 3.9   * 112* 110   CO2 23 23 25   BUN 37* 39* 37*   CREATININE 1.8* 1.9* 1.8*   GLUCOSE 145* 177* 226*     Ca/Mg/Phos:   Recent Labs     06/13/20  1011 06/13/20  1012 06/14/20  0504 06/15/20  0433   CALCIUM 8.5  --  8.3 8.0*   MG  --  2.00  --   --      Hepatic:   No results for input(s): AST, ALT, ALB, BILITOT, ALKPHOS in the last 72 hours. Troponin:   No results for input(s): TROPONINI in the last 72 hours. BNP: No results for input(s): BNP in the last 72 hours. Lipids: No results for input(s): CHOL, TRIG, HDL, LDLCALC, LABVLDL in the last 72 hours. ABGs: No results for input(s): PHART, PO2ART, UJK7OCH in the last 72 hours. INR:   Recent Labs     06/13/20  1011 06/14/20  0504 06/15/20  0433   INR 3.32* 2.49* 1.84*     UA:  No results for input(s): COLORU, CLARITYU, GLUCOSEU, BILIRUBINUR, KETUA, SPECGRAV, BLOODU, PHUR, PROTEINU, UROBILINOGEN, NITRU, LEUKOCYTESUR, Bonnielee Alek in the last 72 hours. Urine Microscopic:   No results for input(s): LABCAST, BACTERIA, COMU, HYALCAST, WBCUA, RBCUA, EPIU in the last 72 hours. Urine Culture: No results for input(s): LABURIN in the last 72 hours. Urine Chemistry: No results for input(s): Leigh People, PROTEINUR, NAUR in the last 72 hours. IMAGING:  XR CHEST PORTABLE   Final Result   Stable moderate cardiomegaly. CT ABDOMEN PELVIS WO CONTRAST   Final Result   Subtle injection of the fat surrounds the bladder. Recommend correlation   with urinalysis to exclude cystitis. There is prostatomegaly      Punctate calcifications in the right and left kidney, either vascular or   nonobstructing renal stones. No hydronephrosis      Cholelithiasis             Assessment/Plan :    1. ROSE .

## 2020-06-15 NOTE — PROGRESS NOTES
Dat Fletcher is a 82 y.o. male patient. 1. ROSE (acute kidney injury) ([facility]    2. Retention of urine    3.  Altered mental status, unspecified altered mental status type      Past Medical History:   Diagnosis Date    A-fib ([facility]     Arthritis     CKD (chronic kidney disease) stage 2, GFR 60-89 ml/min 12/22/2018    Diabetes mellitus ([facility]     GERD (gastroesophageal reflux disease) 12/22/2018    High blood pressure     Hyperlipidemia     Osteoarthritis     Type II or unspecified type diabetes mellitus without mention of complication, not stated as uncontrolled     Vitamin D deficiency      Past Surgical History Pertinent Negatives:   Procedure Date Noted    APPENDECTOMY 05/19/2013    BRAIN SURGERY 05/19/2013    CARDIAC VALVE REPLACEMENT 05/19/2013    CHOLECYSTECTOMY 05/19/2013    COLON SURGERY 05/19/2013    COLONOSCOPY 05/19/2013    CORONARY ARTERY BYPASS GRAFT 05/19/2013    COSMETIC SURGERY 05/19/2013    EYE SURGERY 05/19/2013    FRACTURE SURGERY 05/19/2013    HERNIA REPAIR 05/19/2013    JOINT REPLACEMENT 05/19/2013    PROSTATE SURGERY 05/19/2013    SMALL INTESTINE SURGERY 05/19/2013    SPINE SURGERY 05/19/2013    UPPER GASTROINTESTINAL ENDOSCOPY 05/19/2013    VASECTOMY 05/19/2013     Scheduled Meds:   warfarin  1.5 mg Oral Daily    finasteride  5 mg Oral Daily    mirtazapine  15 mg Oral Nightly    QUEtiapine  25 mg Oral Nightly    donepezil  10 mg Oral Nightly    memantine  10 mg Oral BID    potassium bicarb-citric acid  40 mEq Oral Daily    insulin glargine  5 Units Subcutaneous Nightly    atorvastatin  40 mg Oral Daily    atenolol  25 mg Oral Daily    pantoprazole  40 mg Oral QAM AC    tamsulosin  0.4 mg Oral Daily    insulin lispro  0-6 Units Subcutaneous TID WC    insulin lispro  0-3 Units Subcutaneous Nightly     Continuous Infusions:   lactated ringers 75 mL/hr at 06/14/20 2240    dextrose       PRN Meds:sennosides-docusate sodium, glucose, dextrose, glucagon (rDNA), dextrose, acetaminophen **OR** acetaminophen, ondansetron **OR** ondansetron    No Known Allergies  Principal Problem:    Acute kidney injury superimposed on chronic kidney disease (HCC)  Active Problems:    Diabetes mellitus ([address]    A-fib (HCC)    BPH (benign prostatic hyperplasia)    Hypertension    DM2 (diabetes mellitus, type 2) (HCC)    Hypomagnesemia    Acute retention of urine    Altered mental status    Mild cognitive impairment  Resolved Problems:    * No resolved hospital problems. *    Blood pressure 136/84, pulse 82, temperature 97.4 °F (36.3 °C), temperature source Axillary, resp. rate 18, height 5' 7\" (1.702 m), weight 170 lb 1.6 oz (77.2 kg), SpO2 97 %. Subjective:   Diet: Adequate intake. Activity level: Returning to normal.    Pain control: Well controlled. Objective:  Vital signs (most recent): Blood pressure 136/84, pulse 82, temperature 97.4 °F (36.3 °C), temperature source Axillary, resp. rate 18, height 5' 7\" (1.702 m), weight 170 lb 1.6 oz (77.2 kg), SpO2 97 %. General appearance: Comfortable. Lungs:  Normal effort. Abdomen: Abdomen is soft. Extremities: There is normal range of motion. Neurological: The patient is alert. Assessment:   Condition: In stable condition.        renal failure  bph  Altered mental status    recc    Jesus jimenez in am  flomax  Creat stable    Jayla Montgomery MD  6/15/2020

## 2020-06-16 LAB
ANION GAP SERPL CALCULATED.3IONS-SCNC: 6 MMOL/L (ref 3–16)
BASOPHILS ABSOLUTE: 0.1 K/UL (ref 0–0.2)
BASOPHILS RELATIVE PERCENT: 0.5 %
BUN BLDV-MCNC: 35 MG/DL (ref 7–20)
CALCIUM SERPL-MCNC: 8.1 MG/DL (ref 8.3–10.6)
CHLORIDE BLD-SCNC: 107 MMOL/L (ref 99–110)
CO2: 26 MMOL/L (ref 21–32)
CREAT SERPL-MCNC: 1.7 MG/DL (ref 0.8–1.3)
EOSINOPHILS ABSOLUTE: 0.3 K/UL (ref 0–0.6)
EOSINOPHILS RELATIVE PERCENT: 2 %
GFR AFRICAN AMERICAN: 47
GFR NON-AFRICAN AMERICAN: 39
GLUCOSE BLD-MCNC: 210 MG/DL (ref 70–99)
GLUCOSE BLD-MCNC: 221 MG/DL (ref 70–99)
GLUCOSE BLD-MCNC: 231 MG/DL (ref 70–99)
GLUCOSE BLD-MCNC: 242 MG/DL (ref 70–99)
GLUCOSE BLD-MCNC: 243 MG/DL (ref 70–99)
HCT VFR BLD CALC: 33.8 % (ref 40.5–52.5)
HEMOGLOBIN: 11.2 G/DL (ref 13.5–17.5)
INR BLD: 1.54 (ref 0.86–1.14)
LYMPHOCYTES ABSOLUTE: 2.4 K/UL (ref 1–5.1)
LYMPHOCYTES RELATIVE PERCENT: 18.2 %
MCH RBC QN AUTO: 30.4 PG (ref 26–34)
MCHC RBC AUTO-ENTMCNC: 33 G/DL (ref 31–36)
MCV RBC AUTO: 92.3 FL (ref 80–100)
MONOCYTES ABSOLUTE: 0.7 K/UL (ref 0–1.3)
MONOCYTES RELATIVE PERCENT: 5.3 %
NEUTROPHILS ABSOLUTE: 9.6 K/UL (ref 1.7–7.7)
NEUTROPHILS RELATIVE PERCENT: 74 %
PDW BLD-RTO: 14.7 % (ref 12.4–15.4)
PERFORMED ON: ABNORMAL
PLATELET # BLD: 184 K/UL (ref 135–450)
PMV BLD AUTO: 10.6 FL (ref 5–10.5)
POTASSIUM SERPL-SCNC: 4.4 MMOL/L (ref 3.5–5.1)
PROTHROMBIN TIME: 18 SEC (ref 10–13.2)
RBC # BLD: 3.66 M/UL (ref 4.2–5.9)
SODIUM BLD-SCNC: 139 MMOL/L (ref 136–145)
WBC # BLD: 13 K/UL (ref 4–11)

## 2020-06-16 PROCEDURE — 85610 PROTHROMBIN TIME: CPT

## 2020-06-16 PROCEDURE — 6370000000 HC RX 637 (ALT 250 FOR IP): Performed by: FAMILY MEDICINE

## 2020-06-16 PROCEDURE — 80048 BASIC METABOLIC PNL TOTAL CA: CPT

## 2020-06-16 PROCEDURE — 85025 COMPLETE CBC W/AUTO DIFF WBC: CPT

## 2020-06-16 PROCEDURE — 2580000003 HC RX 258: Performed by: INTERNAL MEDICINE

## 2020-06-16 PROCEDURE — 2060000000 HC ICU INTERMEDIATE R&B

## 2020-06-16 PROCEDURE — 6370000000 HC RX 637 (ALT 250 FOR IP): Performed by: UROLOGY

## 2020-06-16 PROCEDURE — 6370000000 HC RX 637 (ALT 250 FOR IP): Performed by: HOSPITALIST

## 2020-06-16 PROCEDURE — 6370000000 HC RX 637 (ALT 250 FOR IP): Performed by: INTERNAL MEDICINE

## 2020-06-16 RX ORDER — DONEPEZIL HYDROCHLORIDE 5 MG/1
5 TABLET, FILM COATED ORAL NIGHTLY
Status: DISCONTINUED | OUTPATIENT
Start: 2020-06-16 | End: 2020-06-17 | Stop reason: HOSPADM

## 2020-06-16 RX ADMIN — DONEPEZIL HYDROCHLORIDE 5 MG: 5 TABLET, FILM COATED ORAL at 21:46

## 2020-06-16 RX ADMIN — WARFARIN SODIUM 3 MG: 2 TABLET ORAL at 16:58

## 2020-06-16 RX ADMIN — SODIUM CHLORIDE, POTASSIUM CHLORIDE, SODIUM LACTATE AND CALCIUM CHLORIDE: 600; 310; 30; 20 INJECTION, SOLUTION INTRAVENOUS at 10:43

## 2020-06-16 RX ADMIN — INSULIN LISPRO 2 UNITS: 100 INJECTION, SOLUTION INTRAVENOUS; SUBCUTANEOUS at 16:58

## 2020-06-16 RX ADMIN — MIRTAZAPINE 15 MG: 15 TABLET, FILM COATED ORAL at 21:47

## 2020-06-16 RX ADMIN — ATENOLOL 25 MG: 25 TABLET ORAL at 09:03

## 2020-06-16 RX ADMIN — TAMSULOSIN HYDROCHLORIDE 0.4 MG: 0.4 CAPSULE ORAL at 09:03

## 2020-06-16 RX ADMIN — FINASTERIDE 5 MG: 5 TABLET, FILM COATED ORAL at 09:03

## 2020-06-16 RX ADMIN — PANTOPRAZOLE SODIUM 40 MG: 40 TABLET, DELAYED RELEASE ORAL at 09:03

## 2020-06-16 RX ADMIN — SODIUM CHLORIDE, POTASSIUM CHLORIDE, SODIUM LACTATE AND CALCIUM CHLORIDE: 600; 310; 30; 20 INJECTION, SOLUTION INTRAVENOUS at 21:48

## 2020-06-16 RX ADMIN — DESMOPRESSIN ACETATE 40 MG: 0.2 TABLET ORAL at 09:03

## 2020-06-16 RX ADMIN — INSULIN GLARGINE 5 UNITS: 100 INJECTION, SOLUTION SUBCUTANEOUS at 21:49

## 2020-06-16 RX ADMIN — INSULIN LISPRO 1 UNITS: 100 INJECTION, SOLUTION INTRAVENOUS; SUBCUTANEOUS at 21:48

## 2020-06-16 RX ADMIN — MEMANTINE 10 MG: 5 TABLET ORAL at 09:03

## 2020-06-16 RX ADMIN — INSULIN LISPRO 2 UNITS: 100 INJECTION, SOLUTION INTRAVENOUS; SUBCUTANEOUS at 09:14

## 2020-06-16 RX ADMIN — POTASSIUM BICARBONATE 40 MEQ: 782 TABLET, EFFERVESCENT ORAL at 09:02

## 2020-06-16 RX ADMIN — INSULIN LISPRO 2 UNITS: 100 INJECTION, SOLUTION INTRAVENOUS; SUBCUTANEOUS at 11:51

## 2020-06-16 ASSESSMENT — PAIN SCALES - WONG BAKER
WONGBAKER_NUMERICALRESPONSE: 0

## 2020-06-16 NOTE — CARE COORDINATION
RE received call from Sabrina James at the [facility] stating that they have received precert and pt able to come to the facility. Spoke with hospitalist who stated he wanted to keep the pt 1 more night. ER informed [facility]'s pt will discharge tomorrow. Set up transport to [facility] with [facility] at 3pm tomorrow, 6/17. HENS completed. Discharge packet started. Will need notes, scripts, AVS & CHAVA put in packet. Called spouse and updated on discharge plan. Discharge Plan:  [facility] tomorrow, 6/17.   [facility] set for 3pm.    Electronically signed by TYRELL Richards, IANW on 6/16/2020 at 3:13 PM

## 2020-06-16 NOTE — PROGRESS NOTES
Hospitalist Progress Note      PCP: Kaur Steiner MD    Date of Admission: 6/9/2020    LOS: 7    Chief Complaint:   Chief Complaint   Patient presents with    Fatigue     Patient arrives via medic with c.c of lethargy. Patient was admitted to a psychiatric facility in [place] per patients family he has not acted right since he has been home. Per medics family did not state any new medication use that they are aware of . Patient is alert to name. Patient is disoriented to time, place, and situation. Resp e.u and skin warm and dry. Case Summary:   71-year-old gentleman with history of type 2 diabetes, osteoarthritis, hyperlipidemia, hypertension, GERD, stage III kidney disease, atrial fibrillation, BPH, Recent diagnosis of dementia who presented with generalized malaise and lethargy with increased confusion since recent discharge from geriatric psychiatric facility. He was found to having acute on chronic kidney injury with urine retention due to urinary obstruction due to BPH. He was complicated by metabolic acidosis, hypokalemia, hypomagnesemia and acute metabolic encephalopathy. He was consulted by nephrology    C/Joshua Soares [address] Problems    Diagnosis Date Noted    Mild cognitive impairment [G31.84] 06/15/2020    Acute retention of urine [R33.8] 06/09/2020    Acute kidney injury superimposed on chronic kidney disease ([address] [N17.9, N18.9] 06/09/2020    Altered mental status [R41.82] 06/09/2020    Hypomagnesemia [E83.42] 12/22/2018    DM2 (diabetes mellitus, type 2) ([address] [E11.9] 12/22/2018    Hypertension [I10] 06/03/2015    BPH (benign prostatic hyperplasia) [N40.0] 05/30/2013    Diabetes mellitus ([address] [E11.9]     A-fib ([address] [I48.91]          Principal Problem:    Acute kidney injury superimposed on chronic kidney disease ([address]): Creatinine improving with hydration. Serum creatinine 1.7 today.   Peak creatinine 5.4 on admission.   -Monitor renal function and electrolytes  -Continue gentle

## 2020-06-16 NOTE — PROGRESS NOTES
No changes, vitals stable, jimenez catheter emptied. No other needs, callbell in reach, bed alarm on and video monitor in use.   Krish Salazar Rn

## 2020-06-17 VITALS
DIASTOLIC BLOOD PRESSURE: 61 MMHG | RESPIRATION RATE: 16 BRPM | TEMPERATURE: 97.8 F | HEART RATE: 68 BPM | SYSTOLIC BLOOD PRESSURE: 119 MMHG | WEIGHT: 173.2 LBS | BODY MASS INDEX: 27.18 KG/M2 | HEIGHT: 67 IN | OXYGEN SATURATION: 97 %

## 2020-06-17 LAB
ANION GAP SERPL CALCULATED.3IONS-SCNC: 8 MMOL/L (ref 3–16)
BASOPHILS ABSOLUTE: 0.1 K/UL (ref 0–0.2)
BASOPHILS RELATIVE PERCENT: 0.6 %
BUN BLDV-MCNC: 29 MG/DL (ref 7–20)
CALCIUM SERPL-MCNC: 8.2 MG/DL (ref 8.3–10.6)
CHLORIDE BLD-SCNC: 105 MMOL/L (ref 99–110)
CO2: 28 MMOL/L (ref 21–32)
CREAT SERPL-MCNC: 1.5 MG/DL (ref 0.8–1.3)
EOSINOPHILS ABSOLUTE: 0.3 K/UL (ref 0–0.6)
EOSINOPHILS RELATIVE PERCENT: 2.3 %
GFR AFRICAN AMERICAN: 54
GFR NON-AFRICAN AMERICAN: 45
GLUCOSE BLD-MCNC: 161 MG/DL (ref 70–99)
GLUCOSE BLD-MCNC: 170 MG/DL (ref 70–99)
GLUCOSE BLD-MCNC: 176 MG/DL (ref 70–99)
HCT VFR BLD CALC: 32.5 % (ref 40.5–52.5)
HEMOGLOBIN: 10.9 G/DL (ref 13.5–17.5)
INR BLD: 1.54 (ref 0.86–1.14)
LYMPHOCYTES ABSOLUTE: 2.6 K/UL (ref 1–5.1)
LYMPHOCYTES RELATIVE PERCENT: 18.7 %
MCH RBC QN AUTO: 30.6 PG (ref 26–34)
MCHC RBC AUTO-ENTMCNC: 33.4 G/DL (ref 31–36)
MCV RBC AUTO: 91.7 FL (ref 80–100)
MONOCYTES ABSOLUTE: 0.8 K/UL (ref 0–1.3)
MONOCYTES RELATIVE PERCENT: 5.5 %
NEUTROPHILS ABSOLUTE: 10.2 K/UL (ref 1.7–7.7)
NEUTROPHILS RELATIVE PERCENT: 72.9 %
PDW BLD-RTO: 14.9 % (ref 12.4–15.4)
PERFORMED ON: ABNORMAL
PERFORMED ON: ABNORMAL
PLATELET # BLD: 194 K/UL (ref 135–450)
PMV BLD AUTO: 10 FL (ref 5–10.5)
POTASSIUM SERPL-SCNC: 4.1 MMOL/L (ref 3.5–5.1)
PROTHROMBIN TIME: 18 SEC (ref 10–13.2)
RBC # BLD: 3.55 M/UL (ref 4.2–5.9)
SODIUM BLD-SCNC: 141 MMOL/L (ref 136–145)
WBC # BLD: 14 K/UL (ref 4–11)

## 2020-06-17 PROCEDURE — 6370000000 HC RX 637 (ALT 250 FOR IP): Performed by: HOSPITALIST

## 2020-06-17 PROCEDURE — 6370000000 HC RX 637 (ALT 250 FOR IP): Performed by: UROLOGY

## 2020-06-17 PROCEDURE — 85610 PROTHROMBIN TIME: CPT

## 2020-06-17 PROCEDURE — 51798 US URINE CAPACITY MEASURE: CPT

## 2020-06-17 PROCEDURE — 80048 BASIC METABOLIC PNL TOTAL CA: CPT

## 2020-06-17 PROCEDURE — 36415 COLL VENOUS BLD VENIPUNCTURE: CPT

## 2020-06-17 PROCEDURE — 85025 COMPLETE CBC W/AUTO DIFF WBC: CPT

## 2020-06-17 PROCEDURE — 6370000000 HC RX 637 (ALT 250 FOR IP): Performed by: FAMILY MEDICINE

## 2020-06-17 RX ORDER — FINASTERIDE 5 MG/1
5 TABLET, FILM COATED ORAL DAILY
Qty: 30 TABLET | Refills: 3 | DISCHARGE
Start: 2020-06-18

## 2020-06-17 RX ORDER — ATENOLOL 25 MG/1
25 TABLET ORAL DAILY
Qty: 30 TABLET | Refills: 3 | DISCHARGE
Start: 2020-06-18

## 2020-06-17 RX ORDER — MIRTAZAPINE 15 MG/1
15 TABLET, FILM COATED ORAL NIGHTLY
Qty: 30 TABLET | Refills: 3 | DISCHARGE
Start: 2020-06-17

## 2020-06-17 RX ADMIN — INSULIN LISPRO 1 UNITS: 100 INJECTION, SOLUTION INTRAVENOUS; SUBCUTANEOUS at 11:54

## 2020-06-17 RX ADMIN — PANTOPRAZOLE SODIUM 40 MG: 40 TABLET, DELAYED RELEASE ORAL at 05:03

## 2020-06-17 RX ADMIN — INSULIN LISPRO 1 UNITS: 100 INJECTION, SOLUTION INTRAVENOUS; SUBCUTANEOUS at 09:02

## 2020-06-17 RX ADMIN — FINASTERIDE 5 MG: 5 TABLET, FILM COATED ORAL at 08:53

## 2020-06-17 RX ADMIN — POTASSIUM BICARBONATE 40 MEQ: 782 TABLET, EFFERVESCENT ORAL at 08:54

## 2020-06-17 RX ADMIN — DESMOPRESSIN ACETATE 40 MG: 0.2 TABLET ORAL at 08:53

## 2020-06-17 RX ADMIN — TAMSULOSIN HYDROCHLORIDE 0.4 MG: 0.4 CAPSULE ORAL at 08:53

## 2020-06-17 RX ADMIN — ATENOLOL 25 MG: 25 TABLET ORAL at 08:53

## 2020-06-17 NOTE — PROGRESS NOTES
Pt had 715 ml on bladder scan. 1st attempt at jimenez placement was unsuccessful due to blood clots blocking urine flow. Dr. Renetta Avitia notified. After irrigation 750 ml of urine came out of jimenez. Will continue to monitor.

## 2020-06-17 NOTE — PROGRESS NOTES
Office : [phone]     Fax :[phone]       Nephrology Note        Chief Complaint:    Chief Complaint   Patient presents with    Fatigue     Patient arrives via medic with c.c of lethargy. Patient was admitted to a psychiatric facility in [address] per patients family he has not acted right since he has been home. Per medics family did not state any new medication use that they are aware of . Patient is alert to name. Patient is disoriented to time, place, and situation. Resp e.u and skin warm and dry. History of Present iIlness:    Yoni Day is a 82 y.o. male with h/o atrial fib, DM2 , HTN, OA, was brought to the ED for evaluation of increased lethargy, confusion and altered mental status since his discharge from a geriatric psychiatric facility   His son reports that he has recently been diagnosed with dementia and that he was not initiated on any new medications following discharge from his psychiatric stay. Labs were obtained and notable for a severe acute kidney injury with a creatinine of 5.4 and high anion gap metabolic acidosis. He was noted to have urinary retention. Rosas catheter was placed in ED and resulted in drainage of 1.5 L urine. He is awake but confused. Vitals stable     Interval Hx      Awake , alert.    Oral intake poor   Shortness of breath: No   UOP: Fair  Creat: trending down      Past Medical History:   Diagnosis Date    [facility]     Arthritis     CKD (chronic kidney disease) stage 2, GFR 60-89 ml/min 12/22/2018    Diabetes mellitus ([facility]     GERD (gastroesophageal reflux disease) 12/22/2018    High blood pressure     Hyperlipidemia     Osteoarthritis     Type II or unspecified type diabetes mellitus without mention of complication, not stated as uncontrolled     Vitamin D deficiency        Past Surgical History:   Procedure Laterality Date    BACK SURGERY  1979    BACK SURGERY      low disc    ELBOW SURGERY  1988    Right    KNEE ARTHROSCOPY  2000    Right    KNEE ARTHROSCOPY  2001    Left       Family History   Problem Relation Age of Onset    Arthritis Other     Cancer Other     Heart Disease Other     Stroke Other          Current Medications:    warfarin (COUMADIN) tablet 3 mg, Daily  donepezil (ARICEPT) tablet 5 mg, Nightly  finasteride (PROSCAR) tablet 5 mg, Daily  mirtazapine (REMERON) tablet 15 mg, Nightly  potassium bicarb-citric acid (EFFER-K) effervescent tablet 40 mEq, Daily  lactated ringers infusion, Continuous  sennosides-docusate sodium (SENOKOT-S) 8.6-50 MG tablet 1 tablet, Daily PRN  insulin glargine (LANTUS;BASAGLAR) injection pen 5 Units, Nightly  atorvastatin (LIPITOR) tablet 40 mg, Daily  atenolol (TENORMIN) tablet 25 mg, Daily  pantoprazole (PROTONIX) tablet 40 mg, QAM AC  tamsulosin (FLOMAX) capsule 0.4 mg, Daily  glucose (GLUTOSE) 40 % oral gel 15 g, PRN  dextrose 50 % IV solution, PRN  glucagon (rDNA) injection 1 mg, PRN  dextrose 5 % solution, PRN  acetaminophen (TYLENOL) tablet 650 mg, Q6H PRN    Or  acetaminophen (TYLENOL) suppository 650 mg, Q6H PRN  ondansetron (ZOFRAN-ODT) disintegrating tablet 4 mg, Q8H PRN    Or  ondansetron (ZOFRAN) injection 4 mg, Q6H PRN  insulin lispro (1 Unit Dial) 0-6 Units, TID WC  insulin lispro (1 Unit Dial) 0-3 Units, Nightly          Physical exam:     Vitals:  /69   Pulse 76   Temp 97.8 °F (36.6 °C) (Temporal)   Resp 16   Ht 5' 7\" (1.702 m)   Wt 173 lb 3.2 oz (78.6 kg)   SpO2 95%   BMI 27.13 kg/m²   Constitutional:  awake, NAD  HEENT:  MMM  Neck: supple, no JVD  Cardiovascular:  S1, S2 reg  Respiratory: CTA  Abdomen:  +BS, soft, ND  Ext: no lower extremity edema  Skin: dry/intact  CNS: alert, no agitation     Labs:  CBC:   Recent Labs 06/16/20 0449 06/17/20 0454   WBC 13.0* 14.0*   HGB 11.2* 10.9*    194     BMP:    Recent Labs     06/15/20  0433 06/16/20  0449 06/17/20  0454    139 141   K 3.9 4.4 4.1    107 105   CO2 25 26 28   BUN 37* 35* 29*   CREATININE 1.8* 1.7* 1.5*   GLUCOSE 226* 243* 176*     Ca/Mg/Phos:   Recent Labs     06/15/20  0433 06/16/20  0449 06/17/20  0454   CALCIUM 8.0* 8.1* 8.2*     Hepatic:   No results for input(s): AST, ALT, ALB, BILITOT, ALKPHOS in the last 72 hours. Troponin:   No results for input(s): TROPONINI in the last 72 hours. BNP: No results for input(s): BNP in the last 72 hours. Lipids: No results for input(s): CHOL, TRIG, HDL, LDLCALC, LABVLDL in the last 72 hours. ABGs: No results for input(s): PHART, PO2ART, PHS5EWN in the last 72 hours. INR:   Recent Labs     06/15/20  0433 06/16/20  0449 06/17/20  0454   INR 1.84* 1.54* 1.54*     UA:  No results for input(s): Arabella Barefoot, GLUCOSEU, BILIRUBINUR, KETUA, SPECGRAV, BLOODU, PHUR, PROTEINU, UROBILINOGEN, NITRU, LEUKOCYTESUR, LABMICR, URINETYPE in the last 72 hours. Urine Microscopic:   No results for input(s): LABCAST, BACTERIA, COMU, HYALCAST, WBCUA, RBCUA, EPIU in the last 72 hours. Urine Culture: No results for input(s): LABURIN in the last 72 hours. Urine Chemistry: No results for input(s): Carollynn Iha, PROTEINUR, NAUR in the last 72 hours. IMAGING:  XR CHEST PORTABLE   Final Result   Stable moderate cardiomegaly. CT ABDOMEN PELVIS WO CONTRAST   Final Result   Subtle injection of the fat surrounds the bladder. Recommend correlation   with urinalysis to exclude cystitis. There is prostatomegaly      Punctate calcifications in the right and left kidney, either vascular or   nonobstructing renal stones. No hydronephrosis      Cholelithiasis             Assessment/Plan :    1. ROSE . 2/2  Urinary obstruction   Creatinine is downtrending   Has BPH and on flomax. 2. HTN. BP fairly controlled     3.

## 2020-06-17 NOTE — PLAN OF CARE
Problem: Falls - Risk of:  Goal: Will remain free from falls  Description: Will remain free from falls  6/17/2020 1056 by Annie Villegas RN  Outcome: Ongoing  Note: Pt remains free from falls. Safety precautions in place. Bed in lowest position, bed wheels locked, call light with in reach, bed alarm on, yellow blanket in place, fall risk wrist band on, SAFE outside of doorway. Will continue to monitor. 6/16/2020 2144 by Padmini Freed RN  Outcome: Ongoing  Goal: Absence of physical injury  Description: Absence of physical injury  6/17/2020 1056 by Annie Villegas RN  Outcome: Ongoing  6/16/2020 2144 by Padmini Freed RN  Outcome: Ongoing  Note: HIGH fall risk. SBA x2 with walker, working with PT. SAFE to door, arm band in place, fall blanket on bed,siderailx up x3, bed in lowest jposition, wheels locked, callbell in reach, bed alarm on and video monitor in use.

## 2020-06-17 NOTE — PROGRESS NOTES
No changes, morning medication given.   Bed alarm on, video monitor in use, callbell in reach.  raphael Dominguez RN

## 2020-06-17 NOTE — PROGRESS NOTES
Urology Progress Note  [facility]    Provider: Neptali Ellis MD Patient ID:  Admission Date: 2020 Name: Ivelisse Eric Date: 2020 MRN: [id number]   Patient Location: [id number]/[id number] : 1935  Attending: Monique Garcia MD Date of Service: 2020  PCP: Vivian Lovell MD     Diagnoses:  Urinary retention  BPH  ROSE    Assessment/Plan:  Finasteride  Voiding trial - failed catheter replaced  FU outpatient for voiding trial in 1 week, may need further workup with cysto/TRUS vol    The patient had a chance to ask questions which were answered. he understands the above plan. Subjective:   Sinan Del Rosario is a 82 y.o. male. He was seen and examined this morning. Today pt had voiding trial; fu call to nurse indicates failure. No pain or other  sx.    Objective:   Vitals:  Vitals:    20 0423   BP: 115/69   Pulse: 76   Resp: 16   Temp: 97.8 °F (36.6 °C)   SpO2: 95%       Intake/Output Summary (Last 24 hours) at 2020 0745  Last data filed at 2020 0500  Gross per 24 hour   Intake 1942 ml   Output 500 ml   Net 1442 ml     Physical Exam:  Gen: Alert and oriented x3, no acute distress  CV: Regular rate   Resp: unlabored respirations  Abd: Soft, non-distended, non-tender, no masses  Ext: no peripheral edema noted, moves upper and lower extremities spontaneously  Skin: warm and well perfused, no rashes noted on the face, or arms.      Labs:  Lab Results   Component Value Date    WBC 14.0 (H) 2020    HGB 10.9 (L) 2020    HCT 32.5 (L) 2020    MCV 91.7 2020     2020     Lab Results   Component Value Date    CREATININE 1.5 (H) 2020    BUN 29 (H) 2020     2020    K 4.1 2020     2020    CO2 28 2020       Neptali Ellis MD  2020

## 2020-06-17 NOTE — CARE COORDINATION
Plan D/C to [facility] today at 3:00pm [facility] pending a discharge order. Family is aware and agreeable Hens submitted. Please call report [phone].    MD, Please complete & sign the e-CHAVA under the discharge instructions, AVS/Prescriptions, and or medical necessity note for assistance with discharge planning, Thank you, Jose Carlos Lerma, MSW, [phone]

## 2020-06-17 NOTE — DISCHARGE SUMMARY
Hospital Medicine Discharge Summary    Patient ID: Yoni Day      Patient's PCP: Cleveland Pradhan MD    Admit Date: 6/9/2020     Discharge Date:   06/17/20     Admitting Physician: Shakir Toth MD     Discharge Physician: Baldomero Canavan, MD     Discharge Diagnoses: Active Hospital Problems    Diagnosis    Mild cognitive impairment [G31.84]    Acute retention of urine [R33.8]    Acute kidney injury superimposed on chronic kidney disease ([address] [N17.9, N18.9]    Altered mental status [R41.82]    Hypomagnesemia [E83.42]    DM2 (diabetes mellitus, type 2) ([address] [E11.9]    Hypertension [I10]    BPH (benign prostatic hyperplasia) [N40.0]    Diabetes mellitus ([address] [E11.9]    A-fib ([address] [I48.91]       The patient was seen and examined on day of discharge and this discharge summary is in conjunction with any daily progress note from day of discharge. Hospital Course:     Patient with recent diagnosis of dementia, who presented with generalized malaise and lethargy with increased confusion since recent discharge from geriatric psychiatric facility. Was found to having acute on chronic kidney injury with urine retention due to urinary obstruction due to BPH. Condition was complicated by metabolic acidosis, hypokalemia, hypomagnesemia and acute metabolic encephalopathy. Nephrology was consulted. Creatinine improved with IV hydration. Most of the psychiatric medications were eliminated. Patient's mental status improved slowly. On the day of discharge, patient was slow to respond, but appropriate and was able to eat properly. Creatinine was back at 1.5. Was evaluated by urology. Finasteride was added. Rosas catheter was initially inserted. Voiding trial will be performed. If unsuccessful, patient will be transferred to skilled nursing facility with Rosas catheter for voiding trial at the urology office. On warfarin for paroxysmal atrial fibrillation.   Januvia resumed as a lower bladder. Recommend correlation   with urinalysis to exclude cystitis. There is prostatomegaly      Punctate calcifications in the right and left kidney, either vascular or   nonobstructing renal stones. No hydronephrosis      Cholelithiasis                Consults:     IP CONSULT TO HOSPITALIST  IP CONSULT TO NEPHROLOGY  IP CONSULT TO PHARMACY  PHARMACY TO DOSE WARFARIN  IP CONSULT TO CASE MANAGEMENT  IP CONSULT TO UROLOGY  IP CONSULT TO DIETITIAN  IP CONSULT TO SOCIAL WORK    Disposition: Skilled nursing facility. Condition at Discharge: Stable    Discharge Instructions/Follow-up: Weekly CBC and basic metabolic panel. Twice a week PT/INR.     Code Status:  Limited     Activity: activity as tolerated    Diet: DIET RENAL; Carb Control: 4 carb choices (60 gms)/meal; Dysphagia Soft and Bite-Sized  Dietary Nutrition Supplements: Renal Oral Supplement       Discharge Medications:     Current Discharge Medication List           Details   mirtazapine (REMERON) 15 MG tablet Take 1 tablet by mouth nightly  Qty: 30 tablet, Refills: 3      potassium bicarb-citric acid (EFFER-K) 20 MEQ TBEF effervescent tablet Take 2 tablets by mouth daily  Qty: 120 tablet      finasteride (PROSCAR) 5 MG tablet Take 1 tablet by mouth daily  Qty: 30 tablet, Refills: 3              Details   SITagliptin (JANUVIA) 100 MG tablet Take 0.5 tablets by mouth daily  Qty: 30 tablet, Refills: 3      atenolol (TENORMIN) 25 MG tablet Take 1 tablet by mouth daily  Qty: 30 tablet, Refills: 3              Details   omeprazole (PRILOSEC) 40 MG delayed release capsule Take 40 mg by mouth daily      donepezil (ARICEPT) 5 MG tablet Take 10 mg by mouth nightly       warfarin (COUMADIN) 3 MG tablet Take 3 mg by mouth daily       magnesium oxide (MAG-OX) 400 MG tablet Take 400 mg by mouth daily      atorvastatin (LIPITOR) 40 MG tablet Take 1 tablet by mouth daily  Qty: 30 tablet, Refills: 2      tamsulosin (FLOMAX) 0.4 MG capsule Take 1 capsule by mouth

## 2020-11-03 PROBLEM — I10 HIGH BLOOD PRESSURE: Status: RESOLVED | Noted: 2020-11-03 | Resolved: 2020-11-03
